# Patient Record
Sex: FEMALE | Employment: UNEMPLOYED | URBAN - METROPOLITAN AREA
[De-identification: names, ages, dates, MRNs, and addresses within clinical notes are randomized per-mention and may not be internally consistent; named-entity substitution may affect disease eponyms.]

---

## 2021-11-04 ENCOUNTER — TELEPHONE (OUTPATIENT)
Dept: INTERNAL MEDICINE | Age: 31
End: 2021-11-04

## 2021-11-04 RX ORDER — SERTRALINE HYDROCHLORIDE 100 MG/1
200 TABLET, FILM COATED ORAL DAILY
COMMUNITY
Start: 2021-11-02

## 2021-11-04 RX ORDER — RIZATRIPTAN BENZOATE 10 MG/1
10 TABLET ORAL
COMMUNITY

## 2021-11-04 RX ORDER — ERENUMAB-AOOE 140 MG/ML
140 INJECTION, SOLUTION SUBCUTANEOUS
COMMUNITY
Start: 2021-11-02 | End: 2021-12-29 | Stop reason: SDUPTHER

## 2021-11-04 ASSESSMENT — PROMIS GLOBAL HEALTH SCALE
HOW IS THE PROMIS V1.1 BEING ADMINISTERED?: 1
IN THE PAST 7 DAYS, HOW WOULD YOU RATE YOUR FATIGUE ON AVERAGE [ON A SCALE FROM 1 (NONE) TO 5 (VERY SEVERE)]?: 5
SUM OF RESPONSES TO QUESTIONS 2, 4, 5, & 10: 16
IN GENERAL, WOULD YOU SAY YOUR HEALTH IS...[ON A SCALE OF 1 (POOR) TO 5 (EXCELLENT)]: 5
SUM OF RESPONSES TO QUESTIONS 3, 6, 7, & 8: 16
WHO IS THE PERSON COMPLETING THE PROMIS V1.1 SURVEY?: 0
IN THE PAST 7 DAYS, HOW OFTEN HAVE YOU BEEN BOTHERED BY EMOTIONAL PROBLEMS, SUCH AS FEELING ANXIOUS, DEPRESSED, OR IRRITABLE [ON A SCALE FROM 1 (NEVER) TO 5 (ALWAYS)]?: 2
IN GENERAL, HOW WOULD YOU RATE YOUR SATISFACTION WITH YOUR SOCIAL ACTIVITIES AND RELATIONSHIPS [ON A SCALE OF 1 (POOR) TO 5 (EXCELLENT)]?: 5
IN THE PAST 7 DAYS, HOW WOULD YOU RATE YOUR PAIN ON AVERAGE [ON A SCALE FROM 0 (NO PAIN) TO 10 (WORST IMAGINABLE PAIN)]?: 1
IN GENERAL, HOW WOULD YOU RATE YOUR PHYSICAL HEALTH [ON A SCALE OF 1 (POOR) TO 5 (EXCELLENT)]?: 5
IN GENERAL, WOULD YOU SAY YOUR QUALITY OF LIFE IS...[ON A SCALE OF 1 (POOR) TO 5 (EXCELLENT)]: 5
IN GENERAL, HOW WOULD YOU RATE YOUR MENTAL HEALTH, INCLUDING YOUR MOOD AND YOUR ABILITY TO THINK [ON A SCALE OF 1 (POOR) TO 5 (EXCELLENT)]?: 4
IN GENERAL, PLEASE RATE HOW WELL YOU CARRY OUT YOUR USUAL SOCIAL ACTIVITIES (INCLUDES ACTIVITIES AT HOME, AT WORK, AND IN YOUR COMMUNITY, AND RESPONSIBILITIES AS A PARENT, CHILD, SPOUSE, EMPLOYEE, FRIEND, ETC) [ON A SCALE OF 1 (POOR) TO 5 (EXCELLENT)]?: 5
TO WHAT EXTENT ARE YOU ABLE TO CARRY OUT YOUR EVERYDAY PHYSICAL ACTIVITIES SUCH AS WALKING, CLIMBING STAIRS, CARRYING GROCERIES, OR MOVING A CHAIR [ON A SCALE OF 1 (NOT AT ALL) TO 5 (COMPLETELY)]?: 5

## 2021-11-04 NOTE — TELEPHONE ENCOUNTER
Specialty Medication Service    Patient's Name: Chey Young YOB: 1990      Chey Young is a 28 y.o. female presenting today for Specialty Medication Service visit. Patient is prescribed SMS formulary medication, Aimovig. Medication list updated. Specialty Medication: AIMOVIG 140 MG/ML AUTOINJECTOR   Frequency: Every 30 days  Indication: Migraines  Initially Diagnosed: 2013  Additional Therapy:   · Maxalt  Previous Therapy:   · Propranolol  · Topiramate    Specialist:   Vikram Xiao  [address]  [phone]    Specialist Progress Note Available: Yes, scanned in Media tab  Last Specialist Visit: 11/22/2021 - doing well on Aimovig, continue meds as prescribed    Not on File     No past medical history on file. Social History     Tobacco Use    Smoking status: Not on file   Substance Use Topics    Alcohol use: Not on file     No family history on file. REVIEW OF CURRENT DISEASE STATE  She has a well established history of recurrent migraines. The migraines usually occur rare and typically last a few hour. The headaches are brought on by missing meals and lack of sleep, and are relieved by Rx Meds - triptan therapy. Current number of migraines in past month: 1 Her migraines have been getting better lately. Migraine treatment typically includes triptan therapy and CGRP inhibitors. Lifestyle factors associated with migraines:   More than 2 drinks per day? No  Dehydration? No  High caffeine intake? No  High stress level? No  Irregular sleep patterns? No  Skipping meals? Yes  Too much screen time?  No    · Considering all the ways in which this illness and health conditions may affect you at this time, how are you doing (0 = very well, 10 = very poorly): 0  · How would you rate your pain on average? (0 = no pain, 10 = worst pain imaginable) 1  · During the past 4 weeks, have you missed any planned activities of daily living due to condition (work/school/other plans)? No  · During the past 4 weeks, have you had to seek urgent care, ER admission, Unplanned doctor office visit, or hospital admission? No    Contraindications to therapy present? No    MEDICATIONS  Current Outpatient Medications   Medication Sig Dispense Refill    AIMOVIG 140 MG/ML SOAJ Inject 140 mg into the skin every 30 days      sertraline (ZOLOFT) 100 MG tablet Take 200 mg by mouth daily       No current facility-administered medications for this visit. Current Specialty Medication:   Erenumab (Aimovig)  ADR Monitoring: Hypersensitivity reactions and Constipation including cases with serious complications resulting in hospitalization and surgery, has been reported. Constipation has generally occurred after the first dose; however, a later onset has also been observed. Concurrent use of medications that decrease GI motility may increase the risk for more severe constipation and the potential for constipation-related complications. Lab Monitoring: None recommended at this time. Instructed patient to keep tract of number of monthly migraine days to access efficacy  Storage: Refrigerate pre-filled syringe at 2°C to 8°C (36°F to 46°F). Do not freeze. Do not shake. Handling: Keep in original carton to protect from light until time of use. If removed from fridge, keep at room temperature in the original carton. (Must be used within 7 days). Do not use beyond date on carton if refrigerated or beyond 7 days if out of refrigerator.    Prior to SQ administration, remove from refrigerator and allow to sit at room temperature, outside of carton for at least 30 minutes (protect from direct sunlight) - do not warm by using other heat source (e.x hot water or microwave)  Patient reported side effects: none    Specialty Medication Start Date: 2020  Appropriate Dose: Yes    Describe your medication adherence over the last 4 weeks: Excellent  How many doses have you missed in the last the past 7 days how often have you been bothered by emotional problems such as feeling anxious, depressed or irritable? 2   In the past 7 days how would you rate your fatigue on average? 5   In the past 7 days how would you rate your pain on average? 1   Mode of Collection 1   Person Completing Survey 0   PROMIS Physical Score 16   PROMIS Mental Score 16     No flowsheet data found. 1. Migraine Headache   Mike Watson is a 28 y.o. female being treated for Migraine.  Medication reconciliation completed, no drug-drug interactions identified. Allergy and diagnosis info reviewed and updated. Mike Watson has a history remarkable for the following conditions: migraines   The patient has previously been treated with topiramate, propranolol. Current therapy includes: Aimovig + Maxalt   Warnings, precautions, and contraindications reviewed with the patient. Also reviewed storage, administration, and proper disposal.   Current disease state symptoms include: 1 migraine per month that lasts a couple hours   Medication Effectiveness: Patient disease is  well controlled on current therapy.  No side effects/adverse events reported, and no adherence issues identified.  Reviewed copay and advised patient that they will receive a copy of the patient rights and responsibility document with their welcome packet in the mail.  Patient is not considered high risk. Based on patient feedback/results of the assessment, the therapy is  still appropriate.  No medication-related problem(s) or patient need(s) identified that would require a care plan. Follow up in 180 days     2. Immunizations   Immunization status: up to date and documented, missing doses of COVID booster. Patient verbalized understanding. 3. Drug Interactions   None    PLAN  Goals of therapy, common side effects, medication storage, and administration reviewed with patient.   continue Aimovig 140 mg every 30 days as prescribed Recommended monitoring to complete: none at this time  Recommended immunizations: COVID  Non pharmacotherapy recommendations: Get enough sleep; Reduce stress; Drink plenty of water; Avoid triggers; Regular physical exercise  Keep all scheduled appointments.      Gino Hernandez, PharmD, [address]  Ambulatory Clinical Pharmacist   Specialty Medication Service  Phone: [phone]    For Pharmacy [address] in place:  No   Recommendation Provided To: Provider: 1 via Note to Provider and Patient/Caregiver: 1 via Telephone   Intervention Detail: Adherence Monitorin, Refill(s) Provided and Vaccine Recommended/Administered    Intervention Accepted By: Provider: 1 and Patient/Caregiver: 1   Time Spent (min): 45

## 2021-12-27 ENCOUNTER — NURSE TRIAGE (OUTPATIENT)
Dept: OTHER | Facility: CLINIC | Age: 31
End: 2021-12-27

## 2021-12-27 NOTE — TELEPHONE ENCOUNTER
Subjective: Caller states \"I started having COVID symptoms this morning \"     Current Symptoms:   +sore throat  +feeling feverish  +non productive cough   +fatigue   -feeling a \"little lightheaded\"   - denies shortness of breath or chest pain     Onset: 1 days ago; sudden    Associated Symptoms: increased sleepiness    Pain Severity: 0/10; N/A; none    Temperature: unsure  N/A     What has been tried:   +no medications or interventions     LMP: 12/24/2021 Pregnant: No    Recommended disposition: Call PCP in 24 hours; advised to proceed to [facility] if no appointments available     Care advice provided, patient verbalizes understanding; denies any other questions or concerns; instructed to call back for any new or worsening symptoms. Follow up with PCP     Attention Provider: Thank you for allowing me to participate in the care of your patient. The patient was connected to triage in response to symptoms provided. Please do not respond through this encounter as the response is not directed to a shared pool.       Reason for Disposition   [1] COVID-19 infection suspected by caller or triager AND [2] mild symptoms (cough, fever, or others) AND [3] negative COVID-19 rapid test    Protocols used: COVID-19 - DIAGNOSED OR SUSPECTED-ADULT-

## 2022-01-03 ENCOUNTER — VIRTUAL VISIT (OUTPATIENT)
Dept: INTERNAL MEDICINE | Age: 32
End: 2022-01-03

## 2022-01-03 DIAGNOSIS — G43.709 CHRONIC MIGRAINE WITHOUT AURA WITHOUT STATUS MIGRAINOSUS, NOT INTRACTABLE: Primary | ICD-10-CM

## 2022-01-03 PROCEDURE — 1111F DSCHRG MED/CURRENT MED MERGE: CPT | Performed by: INTERNAL MEDICINE

## 2022-01-03 PROCEDURE — 99999 PR OFFICE/OUTPT VISIT,PROCEDURE ONLY: CPT | Performed by: INTERNAL MEDICINE

## 2022-01-03 RX ORDER — ERENUMAB-AOOE 140 MG/ML
140 INJECTION, SOLUTION SUBCUTANEOUS
Qty: 1 ML | Refills: 6 | Status: SHIPPED | OUTPATIENT
Start: 2022-01-03 | End: 2022-06-22 | Stop reason: SDUPTHER

## 2022-01-03 ASSESSMENT — PATIENT HEALTH QUESTIONNAIRE - PHQ9: DEPRESSION UNABLE TO ASSESS: URGENT/EMERGENT SITUATION

## 2022-01-03 NOTE — PROGRESS NOTES
Initial Specialty Medication Virtual Visit  [facility]  [facility] [address]  [place] [address]  Dept: [phone]  Dept Fax: [phone]  Date of patient's visit: 1/3/2022  Patient's Name:  Noe Meadows YOB: 1990            No care team member to display  ================================================================    REASON FOR VISIT/CHIEF COMPLAINT:  Medication Management (New Patient, Irvin Garcia)    HISTORY OF PRESENTING ILLNESS:  Noe Meadows is 28 y.o. is here for initial virtual visit for specialty medication. Specialty Medication: Aimovig 140 mg/ml  Frequency: every 30 days  Indication: Chronic headaches  Initially Diagnosed: 2013  Specialist: Claudetta Cumins  Last Specialist Visit: 2021  Side effects includes: None   Current symptoms include: headaches much improved  Noe Meadows has no new complain today. Recent blood work done on N/A . DIAGNOSTIC FINDINGS:  CBC:No results found for: WBC, HGB, PLT    BMP:  No results found for: NA, K, CL, CO2, BUN, CREATININE, GLUCOSE    HEMOGLOBIN A1C: No results found for: LABA1C    FASTING LIPID PANEL:No results found for: CHOL, HDL, TRIG    No results found for: SARAHY    No results found for: HAV, HEPAIGM, HEPBIGM, HEPBCAB, HBEAG, HEPCAB        There is no problem list on file for this patient.       Health Maintenance Due   Topic Date Due    Hepatitis C screen  Never done    Varicella vaccine (1 of 2 - 2-dose childhood series) Never done    Depression Screen  Never done    HIV screen  Never done    Cervical cancer screen  Never done    COVID-19 Vaccine (3 - Booster for Pfizer series) 09/19/2021       No Known Allergies      Current Outpatient Medications   Medication Sig Dispense Refill    AIMOVIG 140 MG/ML SOAJ Inject 140 mg into the skin every 30 days 1 mL 6    sertraline (ZOLOFT) 100 MG tablet Take 200 mg by mouth daily      rizatriptan (MAXALT) 10 MG tablet Take 10 mg by mouth once as needed for Migraine May repeat in 2 hours if needed       No current facility-administered medications for this visit. Social History     Tobacco Use    Smoking status: Not on file    Smokeless tobacco: Not on file   Substance Use Topics    Alcohol use: Not on file    Drug use: Not on file       No family history on file. REVIEW OF SYSTEMS:  Review of Systems    PHYSICAL EXAM:  There were no vitals filed for this visit. BP Readings from Last 3 Encounters:   No data found for BP          ASSESSMENT AND PLAN:  Dale Mitchell was seen today for medication management. Diagnoses and all orders for this visit:    Chronic migraine without aura without status migrainosus, not intractable  -     AIMOVIG 140 MG/ML SOAJ; Inject 140 mg into the skin every 30 days  -     [facility]) Specialty Medication Service      FOLLOW UP AND INSTRUCTIONS:  · Follow up with 6 months    · Dale Mitchell received counseling on the following healthy behaviors: nutrition, exercise and medication adherence    · Discussed use, benefit, and side effects of prescribed medications. Barriers to medication compliance addressed. All patient questions answered. Pt voiced understanding.       Fany Delacruz  Director [employer] pharmacy program  [facility]    [facility]  [address] [place] [address]    1/3/2022, 9:02 AM

## 2022-01-03 NOTE — PATIENT INSTRUCTIONS
Medications e-scribe to pharmacy of pt's choice. Patient was put on a wait list and will be contacted to schedule their next follow up appointment once the schedule is available. If the patient is in need of an appointment before their next visit please call the office at [phone]. After Visit Summary  mailed to patient.     SL

## 2022-01-31 PROBLEM — G43.009 MIGRAINE WITHOUT AURA AND WITHOUT STATUS MIGRAINOSUS, NOT INTRACTABLE: Status: ACTIVE | Noted: 2017-11-08

## 2022-04-26 LAB
CHOLESTEROL, TOTAL: 188 MG/DL (ref 0–199)
GLUCOSE BLD-MCNC: 85 MG/DL (ref 70–99)
HDLC SERPL-MCNC: 61 MG/DL (ref 40–59)
LDL CHOLESTEROL CALCULATED: 115 MG/DL (ref 0–129)
TRIGL SERPL-MCNC: 61 MG/DL (ref 0–150)

## 2022-05-15 DIAGNOSIS — G43.709 CHRONIC MIGRAINE WITHOUT AURA WITHOUT STATUS MIGRAINOSUS, NOT INTRACTABLE: ICD-10-CM

## 2022-05-16 RX ORDER — ERENUMAB-AOOE 140 MG/ML
INJECTION, SOLUTION SUBCUTANEOUS
Qty: 1 ML | Refills: 6 | OUTPATIENT
Start: 2022-05-16

## 2022-05-16 NOTE — TELEPHONE ENCOUNTER
Patient is due for SMS follow up 7/1/2022 and will get new prescription at that time.     Mason Tinoco PharmD, [place]  Ambulatory Clinical Pharmacist   Specialty Medication Service  Phone: [phone]    For Pharmacy Admin Tracking Only     CPA in place:  No   Time Spent (min): 15

## 2022-05-16 NOTE — TELEPHONE ENCOUNTER
Request for Aimovig. Next Visit Date:  No future appointments.     Health Maintenance   Topic Date Due    Varicella vaccine (1 of 2 - 2-dose childhood series) Never done    Depression Screen  Never done    HIV screen  Never done    Hepatitis C screen  Never done    Cervical cancer screen  Never done    COVID-19 Vaccine (3 - Booster for Pfizer series) 08/19/2021    DTaP/Tdap/Td vaccine (8 - Td or Tdap) 03/26/2028    Hepatitis A vaccine  Completed    Hepatitis B vaccine  Completed    Meningococcal (ACWY) vaccine  Completed    Flu vaccine  Completed    Hib vaccine  Aged Out    Pneumococcal 0-64 years Vaccine  Aged Out       No results found for: LABA1C          ( goal A1C is < 7)   No results found for: LABMICR  LDL Calculated (mg/dL)   Date Value   04/26/2022 115       (goal LDL is <100)   No results found for: AST, ALT, BUN  BP Readings from Last 3 Encounters:   No data found for BP          (goal 120/80)    All Future Testing planned in [facility]        Patient Active Problem List:     Migraine without aura and without status migrainosus, not intractable

## 2022-05-31 ENCOUNTER — TELEPHONE (OUTPATIENT)
Dept: INTERNAL MEDICINE | Age: 32
End: 2022-05-31

## 2022-06-10 NOTE — TELEPHONE ENCOUNTER
Left message to schedule appointment for SMS. Will continue to outreach as appropriate.      Claudell Braver, PharmD, [address]  Ambulatory Clinical Pharmacist   Specialty Medication Service  Phone: [phone]

## 2022-06-22 ENCOUNTER — TELEPHONE (OUTPATIENT)
Dept: INTERNAL MEDICINE | Age: 32
End: 2022-06-22

## 2022-06-22 DIAGNOSIS — G43.709 CHRONIC MIGRAINE WITHOUT AURA WITHOUT STATUS MIGRAINOSUS, NOT INTRACTABLE: ICD-10-CM

## 2022-06-22 RX ORDER — ERENUMAB-AOOE 140 MG/ML
140 INJECTION, SOLUTION SUBCUTANEOUS
Qty: 1 ML | Refills: 2 | Status: SHIPPED | OUTPATIENT
Start: 2022-06-22 | End: 2022-11-04 | Stop reason: SDUPTHER

## 2022-06-22 NOTE — TELEPHONE ENCOUNTER
Medication Management Service    Date: 6/22/2022  Patient's Name: Taran Meade YOB: 1990            _____________________________________________________________________________________________    Patient due for follow up appointment with SMS. Patient unable to schedule appointment before gap coverage in SMS pharmacist begins. Will issue 3 month prescription to get patient through until next appointment availability.     Gladys Lombardi PharmD, [place]  Ambulatory Clinical Pharmacist   Specialty Medication Service  Phone: [phone]

## 2022-08-15 ENCOUNTER — TELEPHONE (OUTPATIENT)
Dept: INTERNAL MEDICINE | Age: 32
End: 2022-08-15

## 2022-08-15 NOTE — TELEPHONE ENCOUNTER
SWAPNA requesting a return call to schedule SMS f/u with Amado Vlad. No new prescriptions on file for Aimovig at this time.

## 2022-08-31 ENCOUNTER — NURSE TRIAGE (OUTPATIENT)
Dept: OTHER | Facility: CLINIC | Age: 32
End: 2022-08-31

## 2022-08-31 NOTE — TELEPHONE ENCOUNTER
Subjective: Caller states atraumatic back pain,     Current Symptoms: back pain, unable to walk    Onset: 1 day ago; gradual    Associated Symptoms: reduced activity    Pain Severity: 4/10; sharp; waxing and waning, worse with movement    Temperature: no fever by unknown method    What has been tried: ibuprofen yesterday    LMP: NA Pregnant: NA    Recommended disposition: Go to ED Now    Care advice provided, patient verbalizes understanding; denies any other questions or concerns; instructed to call back for any new or worsening symptoms. Patient/caller agrees to proceed to closest appropriate Emergency Department or consider how you are feeling, if you can make it to PCP or [facility] those are options as well if comfortable waiting until they are open. Attention Provider: Thank you for allowing me to participate in the care of your patient. The patient was connected to triage in response to symptoms provided. Please do not respond through this encounter as the response is not directed to a shared pool.       Reason for Disposition   Unable to walk    Protocols used: Back Pain-ADULT-AH

## 2022-09-14 NOTE — TELEPHONE ENCOUNTER
Specialty Medication Service    Date: 9/14/2022  Patient's Name: Dduley May YOB: 1990            _____________________________________________________________________________________________    Left message to schedule PharmD follow up appointment for Specialty Medication Services. Please call: [phone] option 4. Will continue to outreach as appropriate.     Jason Stevenson PharmD, [facility]  Ambulatory Clinical Pharmacist   Specialty Medication Service/[facility]  Phone: [phone]  [place]  Phone: [phone]

## 2022-09-16 NOTE — TELEPHONE ENCOUNTER
Specialty Medication Service    Date: 9/16/2022  Patient's Name: Juan Francisco Cedñeo YOB: 1990            _____________________________________________________________________________________________    Left message to schedule PharmD follow up appointment for Specialty Medication Services. Please call: [phone] option 4. Will continue to outreach as appropriate.     Jalen Miller PharmD, [facility]  Ambulatory Clinical Pharmacist   Specialty Medication Service/[facility]  Phone: [phone]  [place]  Phone: [phone]

## 2022-09-19 NOTE — TELEPHONE ENCOUNTER
Specialty Medication Service    Date: 9/19/2022  Patient's Name: Kylie Waller YOB: 1990            _____________________________________________________________________________________________    Left message to schedule PharmD follow up appointment for Specialty Medication Services. Please call: [phone] option 4. Will continue to outreach as appropriate.     Misty SouzaD, [facility]  Ambulatory Clinical Pharmacist   Specialty Medication Service/[facility]  Phone: [phone]  [place]  Phone: [phone]    For Pharmacy [address] [place] [address] in place:  No  Time Spent (min): 15

## 2022-10-26 ENCOUNTER — TELEPHONE (OUTPATIENT)
Dept: INTERNAL MEDICINE | Age: 32
End: 2022-10-26

## 2022-10-26 NOTE — TELEPHONE ENCOUNTER
Specialty Medication Service    Date: 10/26/2022  Patient's Name: Avelina Morales YOB: 1990            _____________________________________________________________________________________________    Left message to schedule PharmD follow up appointment for Specialty Medication Services. Please call: [phone] option 4. Will continue to outreach as appropriate.     Misty MarroquinD, [facility]  Ambulatory Clinical Pharmacist   Specialty Medication Service/[facility]  Phone: [phone]  [facility]  Phone: [phone]

## 2022-10-28 NOTE — TELEPHONE ENCOUNTER
Specialty Medication Service    Date: 10/28/2022  Patient's Name: Octavio Galvez YOB: 1990            _____________________________________________________________________________________________    Left message to schedule PharmD follow up appointment for Specialty Medication Services. Please call: [phone] option 4. Will continue to outreach as appropriate.     Eloise Rowley PharmD, [facility]  Ambulatory Clinical Pharmacist   Specialty Medication Service/[facility]  Phone: [phone] [address] [facility]  Phone: [phone]

## 2022-10-31 DIAGNOSIS — G43.709 CHRONIC MIGRAINE WITHOUT AURA WITHOUT STATUS MIGRAINOSUS, NOT INTRACTABLE: ICD-10-CM

## 2022-10-31 RX ORDER — ERENUMAB-AOOE 140 MG/ML
INJECTION, SOLUTION SUBCUTANEOUS
Qty: 1 ML | Refills: 2 | OUTPATIENT
Start: 2022-10-31

## 2022-10-31 NOTE — TELEPHONE ENCOUNTER
Specialty Medication Service    Date: 10/31/2022  Patient's Name: Fredis Ma YOB: 1990            _____________________________________________________________________________________________    Left message to schedule PharmD follow up appointment for Specialty Medication Services. Please call: [phone] option 4. Will continue to outreach as appropriate.     Phu Umana PharmD, [facility]  Ambulatory Clinical Pharmacist   Specialty Medication Service/[facility]  Phone: [phone]  Massimo bran [facility]  Phone: [phone]    For Pharmacy Admin Tracking Only    CPA in place:  Yes  Recommendation Provided To: Patient/Caregiver: 1 via Telephone  Intervention Detail: Scheduled Appointment   Intervention Accepted By: Patient/Caregiver: 0  Time Spent (min): 15

## 2022-10-31 NOTE — TELEPHONE ENCOUNTER
Specialty Medication Service    Date: 10/31/2022  Patient's Name: Jean Claude Fischer YOB: 1990            _____________________________________________________________________________________________    Patient is due for follow up [facility] appointment. Patient has not answered phone calls for needed appointment. Refill denied at this time.     Juliann Mg, PharmD, [facility]  Ambulatory Clinical Pharmacist   Specialty Medication Service/[facility]  Phone: [phone]  [facility]  Phone: [phone]

## 2022-11-01 ENCOUNTER — TELEPHONE (OUTPATIENT)
Dept: INTERNAL MEDICINE | Age: 32
End: 2022-11-01

## 2022-11-01 NOTE — TELEPHONE ENCOUNTER
Patient scheduled for SMS follow up visit on 11/4/22 at 930 AM.       Thanks,  Sena Weinstein, PharmD, BCPS  [address] [place] [address] in place:  No  Recommendation Provided To: Patient/Caregiver: 1 via Telephone  Intervention Detail: Scheduled Appointment  Intervention Accepted By: Patient/Caregiver: 1  Time Spent (min): 10

## 2022-11-04 ENCOUNTER — PHARMACY VISIT (OUTPATIENT)
Dept: INTERNAL MEDICINE | Age: 32
End: 2022-11-04

## 2022-11-04 DIAGNOSIS — G43.709 CHRONIC MIGRAINE WITHOUT AURA WITHOUT STATUS MIGRAINOSUS, NOT INTRACTABLE: ICD-10-CM

## 2022-11-04 PROCEDURE — 99999 PR OFFICE/OUTPT VISIT,PROCEDURE ONLY: CPT | Performed by: PHARMACIST

## 2022-11-04 RX ORDER — ERENUMAB-AOOE 140 MG/ML
140 INJECTION, SOLUTION SUBCUTANEOUS
Qty: 3 ML | Refills: 1 | Status: SHIPPED | OUTPATIENT
Start: 2022-11-04

## 2022-11-04 ASSESSMENT — PATIENT HEALTH QUESTIONNAIRE - PHQ9
SUM OF ALL RESPONSES TO PHQ QUESTIONS 1-9: 0
SUM OF ALL RESPONSES TO PHQ QUESTIONS 1-9: 0
SUM OF ALL RESPONSES TO PHQ9 QUESTIONS 1 & 2: 0
SUM OF ALL RESPONSES TO PHQ QUESTIONS 1-9: 0
SUM OF ALL RESPONSES TO PHQ QUESTIONS 1-9: 0
2. FEELING DOWN, DEPRESSED OR HOPELESS: 0
1. LITTLE INTEREST OR PLEASURE IN DOING THINGS: 0

## 2022-11-04 ASSESSMENT — PROMIS GLOBAL HEALTH SCALE
IN GENERAL, PLEASE RATE HOW WELL YOU CARRY OUT YOUR USUAL SOCIAL ACTIVITIES (INCLUDES ACTIVITIES AT HOME, AT WORK, AND IN YOUR COMMUNITY, AND RESPONSIBILITIES AS A PARENT, CHILD, SPOUSE, EMPLOYEE, FRIEND, ETC) [ON A SCALE OF 1 (POOR) TO 5 (EXCELLENT)]?: 4
WHO IS THE PERSON COMPLETING THE PROMIS V1.1 SURVEY?: 0
IN GENERAL, HOW WOULD YOU RATE YOUR SATISFACTION WITH YOUR SOCIAL ACTIVITIES AND RELATIONSHIPS [ON A SCALE OF 1 (POOR) TO 5 (EXCELLENT)]?: 4
IN GENERAL, WOULD YOU SAY YOUR QUALITY OF LIFE IS...[ON A SCALE OF 1 (POOR) TO 5 (EXCELLENT)]: 4
IN THE PAST 7 DAYS, HOW WOULD YOU RATE YOUR PAIN ON AVERAGE [ON A SCALE FROM 0 (NO PAIN) TO 10 (WORST IMAGINABLE PAIN)]?: 1
SUM OF RESPONSES TO QUESTIONS 3, 6, 7, & 8: 15
HOW IS THE PROMIS V1.1 BEING ADMINISTERED?: 1
TO WHAT EXTENT ARE YOU ABLE TO CARRY OUT YOUR EVERYDAY PHYSICAL ACTIVITIES SUCH AS WALKING, CLIMBING STAIRS, CARRYING GROCERIES, OR MOVING A CHAIR [ON A SCALE OF 1 (NOT AT ALL) TO 5 (COMPLETELY)]?: 5
IN GENERAL, HOW WOULD YOU RATE YOUR MENTAL HEALTH, INCLUDING YOUR MOOD AND YOUR ABILITY TO THINK [ON A SCALE OF 1 (POOR) TO 5 (EXCELLENT)]?: 4
IN THE PAST 7 DAYS, HOW WOULD YOU RATE YOUR FATIGUE ON AVERAGE [ON A SCALE FROM 1 (NONE) TO 5 (VERY SEVERE)]?: 5
IN THE PAST 7 DAYS, HOW OFTEN HAVE YOU BEEN BOTHERED BY EMOTIONAL PROBLEMS, SUCH AS FEELING ANXIOUS, DEPRESSED, OR IRRITABLE [ON A SCALE FROM 1 (NEVER) TO 5 (ALWAYS)]?: 1
IN GENERAL, HOW WOULD YOU RATE YOUR PHYSICAL HEALTH [ON A SCALE OF 1 (POOR) TO 5 (EXCELLENT)]?: 4
SUM OF RESPONSES TO QUESTIONS 2, 4, 5, & 10: 13
IN GENERAL, WOULD YOU SAY YOUR HEALTH IS...[ON A SCALE OF 1 (POOR) TO 5 (EXCELLENT)]: 4

## 2022-11-04 NOTE — PROGRESS NOTES
Specialty Medication Service    Patient's Name: Susanne Arvizu YOB: 1990      Reason for visit: Susanne Arvizu is a 33 y.o. female presenting today for Specialty Medication Service visit follow up. Patient last seen by [facility] 1/3/2022. Patient continues on SMS formulary medication, Aimovig. Pharmacy completed Specialty Medication Service visit for medication monitoring and counseling. Medication list updated. Specialty Medication: AIMOVIG 140 MG/ML AUTOINJECTOR   Frequency: Every 30 days  Indication: Migraines  Initially Diagnosed: 2013  Additional Therapy:   Maxalt  Previous Therapy:   Propranolol  Topiramate    Specialist:   Lopez Moser  [facility] [address]  [phone]    Specialist Progress Note Available: Yes, scanned in Media tab  Last Specialist Visit: 4/28/2022 - doing well on Aimovig, continue meds as prescribed; retiring end of 2022    No Known Allergies    No past medical history on file. Social History     Tobacco Use    Smoking status: Not on file    Smokeless tobacco: Not on file   Substance Use Topics    Alcohol use: Not on file     No family history on file. INTERM HISTORY  Have you been diagnosed with any additional conditions since we last talked? no  Have you developed any new allergies since we last talked? no  Have you stopped taking any medications or supplements since we last talked? no  Have you started taking any additional medications or supplements since we last talked? no    REVIEW OF CURRENT DISEASE STATE  Migraine Headaches: She has a well established history of recurrent migraines. The migraines usually occur rare and typically last a few hour. The headaches are brought on by missing meals and lack of sleep, and are relieved by Rx Meds - triptan therapy. Current number of migraines in past month: 1 Her migraines have been getting better lately. Migraine treatment typically includes triptan therapy and CGRP inhibitors. Lifestyle factors associated with migraines:   More than 2 drinks per day? No  Dehydration? No  High caffeine intake? No  High stress level? No  Irregular sleep patterns? No  Skipping meals? Yes  Too much screen time? No    · Considering all the ways in which illness and health conditions may affect you at this time, please indicate below how you are doing: (0 = very well, 10 = very poorly)? 2  · How would you rate your pain on average? (0 = no pain, 10 = worst pain imaginable) 1  · During the past 4 weeks, have you missed any activities of daily living (work/school)? No  · During the past 4 weeks, have you had to seek urgent care? No    MEDICATIONS  Current Outpatient Medications   Medication Sig Dispense Refill    AIMOVIG 140 MG/ML SOAJ Inject 140 mg into the skin every 30 days 3 mL 1    sertraline (ZOLOFT) 100 MG tablet Take 200 mg by mouth daily      rizatriptan (MAXALT) 10 MG tablet Take 10 mg by mouth once as needed for Migraine May repeat in 2 hours if needed       No current facility-administered medications for this visit. Current Specialty Medication:   Erenumab (Aimovig)  Indication Specific Recommended Dose:   Migraine prevention: 70 to 140 mg once monthly  Contraindications: known serious hypersensitivity to erenumab  Warnings/Precautions: Hypersensitivity reactions and Constipation including cases with serious complications resulting in hospitalization and surgery, has been reported. Constipation has generally occurred after the first dose; however, a later onset has also been observed. Concurrent use of medications that decrease GI motility may increase the risk for more severe constipation and the potential for constipation-related complications. Recommended Monitoring: None recommended at this time. Instructed patient to keep tract of number of monthly migraine days to access efficacy  Storage: Refrigerate pre-filled syringe at 2°C to 8°C (36°F to 46°F). Do not freeze. Do not shake. Handling: Keep in original carton to protect from light until time of use. If removed from fridge, keep at room temperature in the original carton. (Must be used within 7 days). Do not use beyond date on carton if refrigerated or beyond 7 days if out of refrigerator. Prior to SQ administration, remove from refrigerator and allow to sit at room temperature, outside of carton for at least 30 minutes (protect from direct sunlight) - do not warm by using other heat source (e.x hot water or microwave)  Patient Reported Side Effects: none    Specialty Medication Start Date: 2020  Appropriate Dose: Yes    Describe your medication adherence over the last 4 weeks: Excellent  How many doses have you missed in the last 4 weeks, if any? 0  How confident are you to follow the injection process and the treatment plan? (0-10) 10 Who injects? self  Does the patient have a current infection of any kind? No  Last refill of abortive medication: 8/28/2022  Number of refills on abortive medication in last 3 months: 1    Contraindications to therapy present? No    Drug Interactions:  No clinically significant interactions identified via [email or web address] Interaction Analysis as category D or higher.  _____________________________________________________________________  Renal Dosing:  Creatinine Clearance: CrCl cannot be calculated (No successful lab value found. ). No renal adjustments necessary.     LABS  No results found for: BUN, CREATININE  No results found for: WBC, HGB, HCT, RBC, PLT  No results found for: ALKPHOS, ALT, AST, BILITOT, BILIDIR, IBILI    IMMUNIZATIONS  Immunization History   Administered Date(s) Administered    COVID-19, PFIZER PURPLE top, DILUTE for use, (age 15 y+), 30mcg/0.3mL 02/25/2021, 03/19/2021    DTP 1990, 1990, 1990, 09/14/1992    DTaP vaccine 05/08/1997    Hepatitis A Adult (Havrix, Vaqta) 04/29/2009, 12/22/2009    Hepatitis B Ped/Adol (Engerix-B, Recombivax HB) 04/29/2009, 06/03/2009, 08/26/2009    Hib vaccine 1990, 1990, 1990    Influenza Virus Vaccine 10/26/2021    Influenza, FLUARIX, FLULAVAL, Marcille Fly (age 10 mo+) AND AFLURIA, (age 1 y+), PF, 0.5mL 09/13/2017, 10/25/2020    Influenza, FLUCELVAX, (age 10 mo+), MDCK, PF, 0.5mL 10/29/2018    MMR 06/09/1992, 05/08/1997    Meningococcal MCV4P (Menactra) 04/29/2009    PPD Test 12/28/2011    Polio OPV 1990, 1990, 1990, 05/08/1997    Tdap (Boostrix, Adacel) 04/29/2009, 03/26/2018      Immunization status: up to date and documented. ASSESSMENTS  Fall Risk 11/4/2022 11/4/2021   2 or more falls in past year? no no   Fall with injury in past year? no no     PROMIS V1.1 Global Health 11/4/2022 11/4/2021   In general, would you say your health is: 4 5   In general, would you say your quality of life is: 4 5   In general, how would you rate your physical health? 4 5   In general, how would you rate your mental health, including your mood and your ability to think? 4 4   In general, how would you rate your satisfaction with your social activities and relationships? 4 5   In general, please rate how well you carry out your usual social activities and roles. (This includes activities at home, at work and in your community, and responsibilities as a parent, child, spouse, employee, friend, etc.) 4 5   To what extent are you able to carry out your everyday physical activities such as walking, climbing stairs, carrying groceries, or moving a chair? 5 5   In the past 7 days how often have you been bothered by emotional problems such as feeling anxious, depressed or irritable? 1 2   In the past 7 days how would you rate your fatigue on average? 5 5   In the past 7 days how would you rate your pain on average? 1 1   Mode of Collection 1 1   Person Completing Survey 0 0   PROMIS Physical Score 15 16   PROMIS Mental Score 13 16     Migraine Headache  Heidi Bonnet is a 33 y.o. female being treated for Migraines.   Medication Loki.    PLAN  Goals of therapy, common side effects, medication storage, and administration reviewed with patient. continue Aimovig 140 mg every 30 days as prescribed   Recommended monitoring to complete: none at this time  Recommended immunizations: none at this time  Non pharmacotherapy recommendations: Get enough sleep; Reduce stress; Drink plenty of water; Avoid triggers; Regular physical exercise  Keep all scheduled appointments. Return to clinic in 6 month(s). or call with any questions or concerns.      Marlys Valdovinos, PharmD, [address]  Ambulatory Clinical Pharmacist   Specialty Medication Service/[facility]  Phone: [phone]  [facility]  Phone: [phone]    For Pharmacy Admin Tracking Only    CPA in place:  Yes  Recommendation Provided To: Patient/Caregiver: 3 via Virtual Visit  Intervention Detail: Refill(s) Provided and Scheduled Appointment   Intervention Accepted By: Patient/Caregiver: 3  Time Spent (min): 60

## 2022-11-04 NOTE — Clinical Note
Shad Paul. Completed pharmacy review for yearly medical director SMS visit. Patient is continuing on [address] for migraines. No recommendations for therapy at this time. Patient is recommended for no vaccination. Medication pended for review.   Thank you, Trev Darnell, PharmD, [facility] Ambulatory Clinical Pharmacist  Specialty Medication Service/[facility] Phone: [phone] [facility] Phone: [phone]

## 2022-12-19 ENCOUNTER — PHARMACY VISIT (OUTPATIENT)
Dept: INTERNAL MEDICINE | Age: 32
End: 2022-12-19

## 2022-12-19 DIAGNOSIS — G43.709 CHRONIC MIGRAINE WITHOUT AURA WITHOUT STATUS MIGRAINOSUS, NOT INTRACTABLE: Primary | ICD-10-CM

## 2022-12-19 PROCEDURE — 1111F DSCHRG MED/CURRENT MED MERGE: CPT | Performed by: INTERNAL MEDICINE

## 2022-12-19 PROCEDURE — 99999 PR OFFICE/OUTPT VISIT,PROCEDURE ONLY: CPT | Performed by: INTERNAL MEDICINE

## 2022-12-19 NOTE — PROGRESS NOTES
Initial Specialty Medication Virtual Visit  [facility]  [facility] [address] [address]  Dept: [phone]  Dept Fax: [phone]  Date of patient's visit: 12/19/2022  Patient's Name:  Genny Montilla YOB: 1990            Patient Care Team:  Sneha Babin MD as PCP - [facility] Provider  ================================================================    REASON FOR VISIT/CHIEF COMPLAINT:  Medication Management (Aimovig )    HISTORY OF PRESENTING ILLNESS:  Genny Montilla is 33 y.o. is here for initial virtual visit for specialty medication. Specialty Medication: Aimovig 140 mg/ml autoinjection  Frequency: Monthly  Indication: Migraines  Initially Diagnosed:   Specialist: Evaristo Valentine MD  Last Specialist Visit: 04/2022  Side effects includes: None   Current symptoms include: Well controlled migraines  Genny Montilla has no new complain today. Recent blood work done on N/A .         DIAGNOSTIC FINDINGS:  CBC:No results found for: WBC, HGB, PLT    BMP:    Lab Results   Component Value Date/Time    GLUCOSE 85 04/26/2022 08:28 AM       HEMOGLOBIN A1C: No results found for: LABA1C    FASTING LIPID PANEL:  Lab Results   Component Value Date    CHOL 188 04/26/2022    HDL 61 (H) 04/26/2022    TRIG 61 04/26/2022       No results found for: SARAHY    No results found for: HAV, HEPAIGM, HEPBIGM, HEPBCAB, HBEAG, HEPCAB        Patient Active Problem List   Diagnosis    Migraine without aura and without status migrainosus, not intractable       Health Maintenance Due   Topic Date Due    Varicella vaccine (1 of 2 - 2-dose childhood series) Never done    HIV screen  Never done    Hepatitis C screen  Never done    Cervical cancer screen  Never done       No Known Allergies      Current Outpatient Medications   Medication Sig Dispense Refill    AIMOVIG 140 MG/ML SOAJ Inject 140 mg into the skin every 30 days 3 mL 1    sertraline (ZOLOFT) 100 MG tablet Take 200 mg by mouth daily      rizatriptan (MAXALT) 10 MG tablet Take 10 mg by mouth once as needed for Migraine May repeat in 2 hours if needed       No current facility-administered medications for this visit. No family history on file. REVIEW OF SYSTEMS:  Review of Systems    PHYSICAL EXAM:  There were no vitals filed for this visit. BP Readings from Last 3 Encounters:   No data found for BP          ASSESSMENT AND PLAN:  There are no diagnoses linked to this encounter. Given the patient's condition, the patient should continue with the current care provided by the pharmacist.    FOLLOW UP AND INSTRUCTIONS:  Follow up in 1 year    [facility] received counseling on the following healthy behaviors: nutrition, exercise, and medication adherence    Discussed use, benefit, and side effects of prescribed medications. Barriers to medication compliance addressed. All patient questions answered. Pt voiced understanding.       Kong Mckenna  Director [facility]  Faculty Internal Medicine    [facility]  [address] [place] [address]    12/19/2022, 8:24 AM

## 2023-01-30 ENCOUNTER — OFFICE VISIT (OUTPATIENT)
Dept: FAMILY MEDICINE CLINIC | Age: 33
End: 2023-01-30
Payer: COMMERCIAL

## 2023-01-30 VITALS
WEIGHT: 196 LBS | DIASTOLIC BLOOD PRESSURE: 78 MMHG | SYSTOLIC BLOOD PRESSURE: 110 MMHG | BODY MASS INDEX: 30.76 KG/M2 | HEIGHT: 67 IN | OXYGEN SATURATION: 97 % | HEART RATE: 92 BPM | TEMPERATURE: 98 F

## 2023-01-30 DIAGNOSIS — M54.42 ACUTE BILATERAL LOW BACK PAIN WITH LEFT-SIDED SCIATICA: Primary | ICD-10-CM

## 2023-01-30 PROCEDURE — 99204 OFFICE O/P NEW MOD 45 MIN: CPT | Performed by: FAMILY MEDICINE

## 2023-01-30 RX ORDER — METHYLPREDNISOLONE 4 MG/1
TABLET ORAL
COMMUNITY
Start: 2023-01-02

## 2023-01-30 RX ORDER — MULTIVITAMIN/IRON/FOLIC ACID 18MG-0.4MG
TABLET ORAL
COMMUNITY

## 2023-01-30 RX ORDER — CYCLOBENZAPRINE HCL 5 MG
5 TABLET ORAL 2 TIMES DAILY PRN
Qty: 20 TABLET | Refills: 0 | Status: SHIPPED | OUTPATIENT
Start: 2023-01-30 | End: 2023-02-09

## 2023-01-30 RX ORDER — IBUPROFEN 800 MG/1
800 TABLET ORAL
Qty: 12 TABLET | Refills: 0 | Status: SHIPPED | OUTPATIENT
Start: 2023-01-30 | End: 2023-02-03

## 2023-01-30 SDOH — ECONOMIC STABILITY: FOOD INSECURITY: WITHIN THE PAST 12 MONTHS, THE FOOD YOU BOUGHT JUST DIDN'T LAST AND YOU DIDN'T HAVE MONEY TO GET MORE.: NEVER TRUE

## 2023-01-30 SDOH — ECONOMIC STABILITY: FOOD INSECURITY: WITHIN THE PAST 12 MONTHS, YOU WORRIED THAT YOUR FOOD WOULD RUN OUT BEFORE YOU GOT MONEY TO BUY MORE.: NEVER TRUE

## 2023-01-30 ASSESSMENT — PATIENT HEALTH QUESTIONNAIRE - PHQ9
SUM OF ALL RESPONSES TO PHQ QUESTIONS 1-9: 0
SUM OF ALL RESPONSES TO PHQ QUESTIONS 1-9: 0
1. LITTLE INTEREST OR PLEASURE IN DOING THINGS: 0
SUM OF ALL RESPONSES TO PHQ9 QUESTIONS 1 & 2: 0
2. FEELING DOWN, DEPRESSED OR HOPELESS: 0
SUM OF ALL RESPONSES TO PHQ QUESTIONS 1-9: 0
SUM OF ALL RESPONSES TO PHQ QUESTIONS 1-9: 0

## 2023-01-30 ASSESSMENT — SOCIAL DETERMINANTS OF HEALTH (SDOH): HOW HARD IS IT FOR YOU TO PAY FOR THE VERY BASICS LIKE FOOD, HOUSING, MEDICAL CARE, AND HEATING?: NOT HARD AT ALL

## 2023-01-30 NOTE — PROGRESS NOTES
Radames Trinidad (:  1990) is a 33 y.o. female,Established patient, here for evaluation of the following chief complaint(s):  Lower Back Pain (Came on sudden while at work. Has happened before in the past, but would like to know why. Was at work and the pain started, couldn't sit or walk. It is hard to stand at times. )        SUBJECTIVE    History of present illness:     Patient presents to establish care    Medical history reviewed  Migraines-states are well controlled on Aimovig  Depression-states is well controlled on Zoloft    Low back pain    Reports pain in bilateral lumbar spine  Reports radiation goes down left leg  Denies lower extremity weakness  Denies inciting injury or trauma  Has had several episodes of this starting back in August  Was seen in the emergency room  Was given Toradol, steroids, and a muscle relaxer  States that the Toradol seemed to help  States the pain started again earlier today  Having a lot of difficulty flexing forward  Sitting is uncomfortable  Denies fevers, history of malignancy, saddle anesthesia, bowel or bladder incontinence or retention    Review of Symptoms: As per HPI. History reviewed. No pertinent past medical history. History reviewed. No pertinent surgical history.   Family History   Problem Relation Age of Onset    Other Mother     Hypertension Father      Social History     Socioeconomic History    Marital status: Single     Spouse name: Not on file    Number of children: Not on file    Years of education: Not on file    Highest education level: Not on file   Occupational History    Not on file   Tobacco Use    Smoking status: Never    Smokeless tobacco: Never   Substance and Sexual Activity    Alcohol use: Yes    Drug use: Never    Sexual activity: Not on file   Other Topics Concern    Not on file   Social History Narrative    Not on file     Social Determinants of Health     Financial Resource Strain: Low Risk     Difficulty of Paying Living Expenses: Not hard at all   Food Insecurity: No Food Insecurity    Worried About Running Out of Food in the Last Year: Never true    Ran Out of Food in the Last Year: Never true   Transportation Needs: Not on file   Physical Activity: Not on file   Stress: Not on file   Social Connections: Not on file   Intimate Partner Violence: Not on file   Housing Stability: Not on file     Patient has no known allergies. Prior to Admission medications    Medication Sig Start Date End Date Taking?  Authorizing Provider   methylPREDNISolone (MEDROL DOSEPACK) 4 MG tablet  1/2/23  Yes Historical Provider, MD   Magnesium Oxide (MAGNESIUM-OXIDE) 250 MG TABS tablet Take by mouth   Yes Historical Provider, MD   vitamin B-2 (RIBOFLAVIN) 100 MG TABS tablet Take by mouth   Yes Historical Provider, MD   ibuprofen (ADVIL;MOTRIN) 800 MG tablet Take 1 tablet by mouth 3 times daily (with meals) for 4 days 1/30/23 2/3/23 Yes Sherri May,    cyclobenzaprine (FLEXERIL) 5 MG tablet Take 1 tablet by mouth 2 times daily as needed for Muscle spasms 1/30/23 2/9/23 Yes Sherri May, DO   AIMOVIG 140 MG/ML SOAJ Inject 140 mg into the skin every 30 days 11/4/22  Yes Cody Santos MD   sertraline (ZOLOFT) 100 MG tablet Take 200 mg by mouth daily 11/2/21  Yes Historical Provider, MD   rizatriptan (MAXALT) 10 MG tablet Take 10 mg by mouth once as needed for Migraine May repeat in 2 hours if needed   Yes Historical Provider, MD       OBJECTIVE     /78 (Site: Right Upper Arm, Position: Standing, Cuff Size: Medium Adult)   Pulse 92   Temp 98 °F (36.7 °C) (Tympanic)   Ht 5' 7\" (1.702 m)   Wt 196 lb (88.9 kg)   LMP 01/29/2023 (Exact Date)   SpO2 97%   BMI 30.70 kg/m²     General: alert and oriented, NAD  Head: normocephalic, atraumatic  Cardiovascular: No cyanosis, no edema  Respiratory: No respiratory distress, no accessory muscle use  Psychological: normal mood and affect    MSK:    Active range of motion  Extension and flexion limited due to pain  Rotation bilaterally grossly intact  Tenderness to palpation in bilateral lumbar paraspinal muscles    Neurological:     Able to walk on toes and heels  Negative straight leg sign bilaterally      ASSESSMENT/PLAN:    Lumbago  Likely mechanical in etiology   Ddx- muscle spasm, herniated disc, lumbosacral sprain  Low suspicion of malignancy, cauda equina, or conus medullaris   Reviewed report of XR from August 2022. No degeneration or fracture. Rx for 800 mg ibuprofen and flexeril  Referral to PT  Will schedule patient for OMT later this week     While patient was leaving office she had a back spasm and sustained a mechanical fall. No LOC or head trauma. Fall witnessed by office staff. Patient place in wheelchair and laid down in exam room. Given suddenness of spasm advised her not to drive home. She called for a ride home and was able to ambulate prior to leaving office    Letter for work excuse until Thursday provided. Migraine headaches  Controlled  Continue Aimovig and Maxalt PRN     Depression  Stable. PHQ 9: 0  Continue sertraline     Return in about 3 days (around 2/2/2023) for OMT . An electronic signature was used to authenticate this note.

## 2023-02-02 ENCOUNTER — OFFICE VISIT (OUTPATIENT)
Dept: FAMILY MEDICINE CLINIC | Age: 33
End: 2023-02-02
Payer: COMMERCIAL

## 2023-02-02 VITALS
TEMPERATURE: 97.5 F | SYSTOLIC BLOOD PRESSURE: 110 MMHG | HEART RATE: 111 BPM | BODY MASS INDEX: 30.7 KG/M2 | OXYGEN SATURATION: 99 % | WEIGHT: 196 LBS | DIASTOLIC BLOOD PRESSURE: 80 MMHG

## 2023-02-02 DIAGNOSIS — M54.42 ACUTE BILATERAL LOW BACK PAIN WITH LEFT-SIDED SCIATICA: Primary | ICD-10-CM

## 2023-02-02 DIAGNOSIS — M99.04 SOMATIC DYSFUNCTION OF SACRAL REGION: ICD-10-CM

## 2023-02-02 DIAGNOSIS — M99.05 SOMATIC DYSFUNCTION OF PELVIC REGION: ICD-10-CM

## 2023-02-02 DIAGNOSIS — M99.03 SOMATIC DYSFUNCTION OF LUMBAR REGION: ICD-10-CM

## 2023-02-02 PROCEDURE — 99213 OFFICE O/P EST LOW 20 MIN: CPT | Performed by: FAMILY MEDICINE

## 2023-02-02 PROCEDURE — 98926 OSTEOPATH MANJ 3-4 REGIONS: CPT | Performed by: FAMILY MEDICINE

## 2023-02-02 SDOH — ECONOMIC STABILITY: FOOD INSECURITY: WITHIN THE PAST 12 MONTHS, THE FOOD YOU BOUGHT JUST DIDN'T LAST AND YOU DIDN'T HAVE MONEY TO GET MORE.: NEVER TRUE

## 2023-02-02 SDOH — ECONOMIC STABILITY: FOOD INSECURITY: WITHIN THE PAST 12 MONTHS, YOU WORRIED THAT YOUR FOOD WOULD RUN OUT BEFORE YOU GOT MONEY TO BUY MORE.: NEVER TRUE

## 2023-02-02 SDOH — ECONOMIC STABILITY: HOUSING INSECURITY
IN THE LAST 12 MONTHS, WAS THERE A TIME WHEN YOU DID NOT HAVE A STEADY PLACE TO SLEEP OR SLEPT IN A SHELTER (INCLUDING NOW)?: NO

## 2023-02-02 SDOH — ECONOMIC STABILITY: INCOME INSECURITY: HOW HARD IS IT FOR YOU TO PAY FOR THE VERY BASICS LIKE FOOD, HOUSING, MEDICAL CARE, AND HEATING?: NOT HARD AT ALL

## 2023-02-02 NOTE — PROGRESS NOTES
Lidia Romero (:  1990) is a 33 y.o. female,Established patient, here for evaluation of the following chief complaint(s):  Lower Back Pain (Here for f/u, pt states pain level is now around 4-5 . )        SUBJECTIVE    History of present illness:     Has not had spasm since Monday  States Flexeril makes her feel tired, not sure if helping  No more mechanical falls  Still having pain in lumbosacral region without radiation  Has been taking Meloxicam as well     Review of Symptoms: As per HPI. No past medical history on file. No past surgical history on file. Family History   Problem Relation Age of Onset    Other Mother     Hypertension Father      Social History     Socioeconomic History    Marital status: Single     Spouse name: Not on file    Number of children: Not on file    Years of education: Not on file    Highest education level: Not on file   Occupational History    Not on file   Tobacco Use    Smoking status: Never    Smokeless tobacco: Never   Substance and Sexual Activity    Alcohol use: Yes    Drug use: Never    Sexual activity: Not on file   Other Topics Concern    Not on file   Social History Narrative    Not on file     Social Determinants of Health     Financial Resource Strain: Low Risk     Difficulty of Paying Living Expenses: Not hard at all   Food Insecurity: No Food Insecurity    Worried About Running Out of Food in the Last Year: Never true    [address] in the Last Year: Never true   Transportation Needs: Unknown    Lack of Transportation (Medical): Not on file    Lack of Transportation (Non-Medical): No   Physical Activity: Not on file   Stress: Not on file   Social Connections: Not on file   Intimate Partner Violence: Not on file   Housing Stability: Unknown    Unable to Pay for Housing in the Last Year: Not on file    Number of Places Lived in the Last Year: Not on file    Unstable Housing in the Last Year: No     Patient has no known allergies.   Prior to Admission medications    Medication Sig Start Date End Date Taking?  Authorizing Provider   methylPREDNISolone (MEDROL DOSEPACK) 4 MG tablet  1/2/23  Yes Historical Provider, MD   Magnesium Oxide (MAGNESIUM-OXIDE) 250 MG TABS tablet Take by mouth   Yes Historical Provider, MD   vitamin B-2 (RIBOFLAVIN) 100 MG TABS tablet Take by mouth   Yes Historical Provider, MD   ibuprofen (ADVIL;MOTRIN) 800 MG tablet Take 1 tablet by mouth 3 times daily (with meals) for 4 days 1/30/23 2/3/23 Yes Alesha Head,    cyclobenzaprine (FLEXERIL) 5 MG tablet Take 1 tablet by mouth 2 times daily as needed for Muscle spasms 1/30/23 2/9/23 Yes Malcom Stovall,    AIMOVIG 140 MG/ML SOAJ Inject 140 mg into the skin every 30 days 11/4/22  Yes Linda Larsen MD   sertraline (ZOLOFT) 100 MG tablet Take 200 mg by mouth daily 11/2/21  Yes Historical Provider, MD   rizatriptan (MAXALT) 10 MG tablet Take 10 mg by mouth once as needed for Migraine May repeat in 2 hours if needed   Yes Historical Provider, MD       OBJECTIVE     /80 (Site: Right Upper Arm, Position: Sitting, Cuff Size: Medium Adult)   Pulse (!) 111   Temp 97.5 °F (36.4 °C) (Tympanic)   Wt 196 lb (88.9 kg)   LMP 01/29/2023 (Exact Date)   SpO2 99%   BMI 30.70 kg/m²     General: alert and oriented, NAD  Head: normocephalic, atraumatic  Cardiovascular: No cyanosis, no edema  Respiratory: No respiratory distress, no accessory muscle use  Psychological: normal mood and affect    MSK:    Pain with active lumbar flexion  Active extension and bl rotation grossly intact   Has pain while standing    Neurological:     No focal neuro deficits     Osteopathic structural exam:    Lumbar: L2 FRS left tw stills technique, L5 ERS right tw balanced membranous tension, bl lumbar paraspinal restriction tw soft tissue     Sacrum: sacrum extended SlRr tw balance membranous tension    Pelvis: right superior pubic shear tw stills technique and balance ligamentous tension     Outcome of treatment: Treatment tolerated well with improvement in patient's somatic dysfunction. Patient's ambulatory and respiratory biomechanics improved post-treatment. ASSESSMENT/PLAN:    Lumbago  Improving. Continue NSAIDs, muscle relaxer PRN   Encouraged patient to schedule with PT   OMT performed today in office     Somatic Dysfunction    An exam for somatic dysfunction was indicated to evaluate for musculoskeletal manifestations of the patient's primary complaint. Relevant somatic dysfunction was identified in multiple region given the neural and myofascial connections to the patient's chief complaint. The risks, benefits, and alternatives to Osteopathic manipulative treatment (OMT) were reviewed with patient who provided verbal consent to proceed with treatment. Patient declined chaperone. Gentle OMT was applied using both indirect and direct technique approaches. Patient tolerated treatment well without immediate complications. Post-treatment instructions were reviewed with patient, including post-treatment soreness. Total number of regions treated: 3    Techniques utilized: see above. Return in about 2 weeks (around 2/16/2023) for f/u OMT low back . An electronic signature was used to authenticate this note.

## 2023-02-07 ENCOUNTER — HOSPITAL ENCOUNTER (OUTPATIENT)
Dept: PHYSICAL THERAPY | Age: 33
Setting detail: THERAPIES SERIES
Discharge: HOME | End: 2023-02-07
Payer: COMMERCIAL

## 2023-02-07 PROCEDURE — 97140 MANUAL THERAPY 1/> REGIONS: CPT

## 2023-02-07 PROCEDURE — 97162 PT EVAL MOD COMPLEX 30 MIN: CPT

## 2023-02-07 PROCEDURE — 97110 THERAPEUTIC EXERCISES: CPT

## 2023-02-07 ASSESSMENT — PAIN DESCRIPTION - ORIENTATION: ORIENTATION: LOWER

## 2023-02-07 ASSESSMENT — PAIN DESCRIPTION - LOCATION: LOCATION: BACK

## 2023-02-07 ASSESSMENT — PAIN SCALES - GENERAL: PAINLEVEL_OUTOF10: 0

## 2023-02-07 NOTE — PROGRESS NOTES
[facility]  PHYSICAL THERAPY EVALUATION    Physical Therapy: Initial Evaluation    Patient: Shannon Madrigal (40 y.o.     female)   Examination Date: 2023   :  1990 ;    Confirmed: Yes MRN: [id number]  CSN: [id number]   Insurance: Payor: Davi Pod / Plan: Lilly Joanna [address] / Product Type: *No Product type* /   Insurance ID: [id number] - ([facility]) Secondary Insurance (if applicable):    Referring Physician: Federica Spear DO       Visits to Date/Visits Approved: 1 /      No Show/Cancelled Appts: 0 / 0     Medical Diagnosis: Acute bilateral low back pain with left-sided sciatica [M54.42]        Treatment Diagnosis: LBP     PERTINENT MEDICAL HISTORY           Medical History: Chart Reviewed: Yes No past medical history on file. Surgical History: No past surgical history on file. Medications:   Current Outpatient Medications:     methylPREDNISolone (MEDROL DOSEPACK) 4 MG tablet, , Disp: , Rfl:     Magnesium Oxide (MAGNESIUM-OXIDE) 250 MG TABS tablet, Take by mouth, Disp: , Rfl:     vitamin B-2 (RIBOFLAVIN) 100 MG TABS tablet, Take by mouth, Disp: , Rfl:     ibuprofen (ADVIL;MOTRIN) 800 MG tablet, Take 1 tablet by mouth 3 times daily (with meals) for 4 days, Disp: 12 tablet, Rfl: 0    cyclobenzaprine (FLEXERIL) 5 MG tablet, Take 1 tablet by mouth 2 times daily as needed for Muscle spasms, Disp: 20 tablet, Rfl: 0    AIMOVIG 140 MG/ML SOAJ, Inject 140 mg into the skin every 30 days, Disp: 3 mL, Rfl: 1    sertraline (ZOLOFT) 100 MG tablet, Take 200 mg by mouth daily, Disp: , Rfl:     rizatriptan (MAXALT) 10 MG tablet, Take 10 mg by mouth once as needed for Migraine May repeat in 2 hours if needed, Disp: , Rfl:   Allergies: Patient has no known allergies. SUBJECTIVE EXAMINATION     History obtained from[de-identified] Patient,           Subjective History:    Subjective: Currently having her 3rd episode of LBP.   This most recent episode started last Monday. Started aching as the day went on. Went to walk in clinic and had 2 episodes of back spasming once which brought her to her knees. Spasm was on both sides but now seems to be more on the Lt. Pt reports only comfortable lying prone over pillows for the first few days. Now, pain worsens with sitting for a while. When driving on cruise control pain can increase when lifting leg up in LB on both sides but worse on Lt. Has tried heat which helps a little bit. Tried a steroid pack which seemed to improve it. Saw a chiropractor last weekand was given 2 stretches - upward dog and nerve flossing (knee flexion with cervical flexion).   Additional Pertinent Hx (if applicable):     Previous treatments prior to current episode?: Chiropractor      Learning/Language: Learning  Does the patient/guardian have any barriers to learning?: No barriers  Will there be a co-learner?: No  What is the preferred language of the patient/guardian?: English  Is an  required?: No  How does the patient/guardian prefer to learn new concepts?: Listening, Reading, Demonstration, Pictures/Videos     Pain Screening    Pain Screening  Patient Currently in Pain: Yes  Pain Assessment: 0-10  Pain Level: 0  Best Pain Level: 0  Worst Pain Level: 7  Pain Location: Back  Pain Orientation: Lower    Functional Status    Social History:    Social History  Lives With: Alone  Type of Home: House  Home Layout: Two level  Home Access: Stairs to enter without rails  Entrance Stairs - Number of Steps: 4    Occupation/Interests:   Occupation: Full time employment  Type of Occupation: SLP    Prior Level of Function:     Independent        Current Level of Function:   ADL Assistance: Independent  Homemaking Assistance: Independent  Ambulation Assistance: Independent  Transfer Assistance: Independent  Active : Yes         OBJECTIVE EXAMINATION     Review of Systems:  Vision: Within Functional Limits  Hearing: Within functional limits  Overall Orientation Status: Within Normal Limits  Patient affect[de-identified] Normal  Follows Commands: Within Functional Limits    Neuro Screen: Sensation  Overall Sensation Status: WFL      Left Strength  Right Strength         Strength LLE  L Hip Flexion: 5/5  L Hip Extension: 4/5  L Hip ABduction: 4+/5  L Hip Internal Rotation: 4/5  L Hip External Rotation: 4/5  L Knee Flexion: 5/5  L Knee Extension: 5/5  L Ankle Dorsiflexion: 5/5    Strength RLE  R Hip Flexion: 5/5  R Hip Extension: 4/5  R Hip ABduction: 4+/5  R Hip Internal Rotation: 4+/5  R Hip External Rotation: 4+/5  R Knee Flexion: 5/5  R Knee Extension: 5/5  R Ankle Dorsiflexion: 5/5       Lumbar Assessment     AROM Lumbar Spine   Measured as: % of normal  Flexion: <25%  Extension: 75%  Lateral Flexion Right: 75%  Lateral Flexion Left: 75%          Muscle Length/Flexibility:   Muscle Length LE  Right Hamstrings: Tight  Left Hamstrings: Tight      Special Tests:   Special Tests Lumbar Spine  Lumbar Special Tests: Performed  JENNA Test: R (-), L (-)  SLR: L (-), R (-)  Crossed SLR: R (+), L (-)        Outcomes Score:  Exam: Decreased ROM and strength with increased pain impacting QOL and job duties. Treatment:    Exercises:   Exercises  Exercise 1: Piriformis str*  Exercise 2: Seated hip IR/ER with TB*  Exercise 3: TA young with march*  Exercise 4: Bridges with TB*  Exercise 5: 4 way hip*  Exercise 6: Prone ext*  Exercise 7: Roadkill*  Exercise 8: Avoid bending, chair vs floor - education*  Exercise 20: HEP: 2 way SLR, clams, reverse clams, piriformis str, roadkill       Manual:  Manual Therapy  Muscle Energy: MET to correct pelvic alignment, Lt leg pulls     *Indicates exercise,modality, or manual techniques to be initiated when appropriate       ASSESSMENT     Impression: Assessment: Pt presents with a recent onset of LBP causing her difficulty with all mobility. Pt demonstrates significant decreased lumbar flexion ROM.   Pt with mild decreased fermin hip strength and likely decreased core strength possibly impacting her pain. Pt appears to have a slightly elevated LT iliac crest and shortened Lt leg length. Completed MET and Lt leg pulls to improve pelvic alignment. Pt with improved discomfort with prone lying. Pt would benefit from further skilled PT to improve her ROM, strength and pain to increase her overall QOL. Body Structures, Functions, Activity Limitations Requiring Skilled Therapeutic Intervention: Decreased functional mobility , Decreased ROM, Decreased strength, Decreased body mechanics, Increased pain, Decreased posture    Statement of Medical Necessity: Physical Therapy is both indicated and medically necessary as outlined in the POC to increase the likelihood of meeting the functionally related goals stated below. Patient's Activity Tolerance: Patient tolerated evaluation without incident      Patient's rehabilitation potential/prognosis is considered to be: Good    Factors which may impact rehabilitation potential include: None     Patient Education: Goals, PT Role, Plan of Care, Evaluative findings, Home Exercise Program      GOALS   Patient Goal(s): Patient Goals : Work with no pain, Go back to yoga    Short Term Goals Completed by 2 weeks Goal Status   Independent with HEP New     Long Term Goals Completed by 5 weeks Goal Status   LTG 1 Improve lumbar ROM to [facility] to increase ease with ADLs. New   LTG 2 Improve fermin LE strength to >/= 4+/5 to improve stability in all positions. New   LTG 3 Pt will reports >/=75% reduction in LB pain.  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 Clinical Presentation: Medium  Clinical Presentation: Evolving    POST-PAIN     Pain Rating (0-10 pain scale): 0  /10  Location and pain description same as pre-treatment unless indicated. Action: [x] NA  [] Call Physician  [] Perform HEP  [] Meds as prescribed    Evaluation and patient rights have been reviewed and patient agrees with plan of care. Yes  [x]  No  []   Explain:     [place] Fall Risk Assessment  Risk Factor Scale  Score   History of Falls [] Yes  [x] No 25  0 0   Secondary Diagnosis [] Yes  [x] No 15  0 0   Ambulatory Aid [] Furniture  [] Crutches/cane/walker  [x] None/bedrest/wheelchair/nurse 30  15  0 0   IV/Heparin Lock [] Yes  [x] No 20  0 0   Gait/Transferring [] Impaired  [] Weak  [x] Normal/bedrest/immobile 20  10  0 0   Mental Status [] Forgets limitations  [x] Oriented to own ability 15  0 0      Total:0     Based on the Assessment score: check the appropriate box.   [x]  No intervention needed   Low =   Score of 0-24  []  Use standard prevention interventions Moderate =  Score of 24-44   [] Discuss fall prevention strategies   [] Indicate moderate falls risk on eval  []  Use high risk prevention interventions High = Score of 45 and higher   [] Discuss fall prevention strategies   [] Provide supervision during treatment time      Minutes:  PT Individual Minutes  Time In: 1605  Time Out: [address]  Minutes: 39  Timed Code Treatment Minutes: 8 Minutes  Procedure Minutes: 31'     Timed Activity Minutes Units   Manual  8 1       Electronically signed by Richard Rosario PT on 2/7/23 at 6:22 PM EST

## 2023-02-07 NOTE — PROGRESS NOTES
[address]     Ph: [phone]  Fax: [phone]      [x] Certification  [] Recertification []  Plan of Care  [] Progress Note [] Discharge      Referring Provider: Baldev Washington DO     From:  Narcisa Barreto, NAN  Patient: Masha Sarmiento (33 y.o. female) : 1990 Date: 2023  Medical Diagnosis: Acute bilateral low back pain with left-sided sciatica [M54.42]       Treatment Diagnosis: LBP    Progress Report Period from:  2023  to 2023    Visits to Date: 1 No Show: 0 Cancelled Appts: 0    OBJECTIVE:   Short Term Goals - Time Frame for Short Term Goals: 2 weeks    Goals Current/Discharge status  Status   Short Term Goal 1: Independent with HEP  ongoing New     Long Term Goals - Time Frame for Long Term Goals : 5 weeks  Goals Current/ Discharge status Status   Long Term Goal 1: Improve lumbar ROM to [facility] to increase ease with ADLs. AROM Lumbar Spine   Measured as: % of normal  Flexion: <25%  Extension: 75%  Lateral Flexion Right: 75%  Lateral Flexion Left: 75%     New   Long Term Goal 2: Improve fermin LE strength to >/= 4+/5 to improve stability in all positions. Strength LLE  L Hip Flexion: 5/5  L Hip Extension: 4/5  L Hip ABduction: 4+/5  L Hip Internal Rotation: 4/5  L Hip External Rotation: 4/5  L Knee Flexion: 5/5  L Knee Extension: 5/5  L Ankle Dorsiflexion: 5/5  Strength RLE  R Hip Flexion: 5/5  R Hip Extension: 4/5  R Hip ABduction: 4+/5  R Hip Internal Rotation: 4+/5  R Hip External Rotation: 4+/5  R Knee Flexion: 5/5  R Knee Extension: 5/5  R Ankle Dorsiflexion: 5/5    New   Long Term Goal 3: Pt will reports >/=75% reduction in LB pain.  Reports pain ranges from 0-7/10 New       Body Structures, Functions, Activity Limitations Requiring Skilled Therapeutic Intervention: Decreased functional mobility , Decreased ROM, Decreased strength, Decreased body mechanics, Increased pain, Decreased posture  Assessment: Pt presents with a recent onset of LBP causing her difficulty with all mobility. Pt demonstrates significant decreased lumbar flexion ROM. Pt with mild decreased fermin hip strength and likely decreased core strength possibly impacting her pain. Pt appears to have a slightly elevated LT iliac crest and shortened Lt leg length. Completed MET and Lt leg pulls to improve pelvic alignment. Pt with improved discomfort with prone lying. Pt would benefit from further skilled PT to improve her ROM, strength and pain to increase her overall QOL. Therapy Prognosis: Good      PT Education: Goals;PT Role;Plan of Care;Evaluative findings;Home Exercise Program    PLAN: [x] Evaluate and Treat  Frequency/Duration:  Plan Frequency: 1-2  Plan weeks: 5  Current Treatment Recommendations: Strengthening, ROM, Functional mobility training, Neuromuscular re-education, Manual, Home exercise program, Patient/Caregiver education & training, Equipment evaluation, education, & procurement, Modalities, Dry needling                     Patient Status:[x] Continue/ Initiate plan of Care    [] Discharge PT. Recommend pt continue with HEP. [] Additional visits requested, Please re-certify for additional visits:    [] Hold         Signature: Electronically signed by Ana Laura Coombs PT on 2/7/23 at 6:24 PM EST      If you have any questions or concerns, please don't hesitate to call.   Thank you for your referral.

## 2023-02-16 ENCOUNTER — APPOINTMENT (OUTPATIENT)
Dept: PHYSICAL THERAPY | Age: 33
End: 2023-02-16
Payer: COMMERCIAL

## 2023-02-16 PROCEDURE — 97110 THERAPEUTIC EXERCISES: CPT

## 2023-02-16 NOTE — PROGRESS NOTES
[facility]  Outpatient Physical Therapy    Treatment Note        Date: 2023  Patient: Kylie Hanna  : 1990   Confirmed: Yes  MRN: [id number]  Referring Provider: Tobi Gary DO    Medical Diagnosis: No admission diagnoses are documented for this encounter. Treatment Diagnosis: LBP    Visit Information:  Insurance: Payor: KelseyJuan Pavonjohnathondarius [address] / Plan: [facility] [address] / Product Type: *No Product type* /   PT Visit Information  PT Insurance Information: [facility]  Total # of Visits to Date: 1  No Show: 0  Canceled Appointment: 0  Progress Note Counter: -10    Subjective Information:  Subjective: Pt reports using a pillow or towel roll when sitting which has helped a lot especially with driving. Pt reports compliance with HEP. HEP Compliance:  [x] Good [] Fair [] Poor [] Reports not doing due to:    Pain Screening  Patient Currently in Pain: No    Treatment:  Exercises:  Exercises  Exercise 2: Seated hip IR/ER with YTB x10 fermin  Exercise 3: TA young 5s x10, TA young with march x10  Exercise 4: Bridges with YTB 5s x10  Exercise 5: 4 way hip YTB x10ea fermin  Exercise 6: Prone lying on elbows 2'  Exercise 7: Roadkil: with Lt knee up 2'  Exercise 8: Edication on sitting posture  Exercise 9: Seated anterior pelvic tilt  Exercise 20: HEP: seated ant pelvic tilt, TA young, TA young with march       Assessment: Body Structures, Functions, Activity Limitations Requiring Skilled Therapeutic Intervention: Decreased functional mobility , Decreased ROM, Decreased strength, Decreased body mechanics, Increased pain, Decreased posture  Assessment: Pt responding well to extension based program with report of decreased overall symptoms and reduced frequency of radicular symptoms. Initiated fermin hip and core strengthening to stability lumbar spine and pelvis to reduce pt's pain and help maintain proper alignment. Pt to continue with HEP and f/u as needed.   Treatment Diagnosis: LBP  Therapy Prognosis: Good     Activity Tolerance  Activity Tolerance: Patient tolerated treatment well    Post-Pain Assessment:       Pain Rating (0-10 pain scale):  0 /10   Location and pain description same as pre-treatment unless indicated. Action: [x] NA   [] Perform HEP  [] Meds as prescribed  [] Modalities as prescribed   [] Call Physician     GOALS   Patient Goal(s): Patient Goals : Work with no pain, Go back to yoga    Short Term Goals Completed by 2 weeks Goal Status   STG 1 Independent with HEP In progress       Long Term Goals Completed by 5 weeks Goal Status   LTG [facility] ease with ADLs. In progress   LTG 2 Improve fermin LE strength to >/= 4+/5 to improve stability in all positions. In progress   LTG 3 Pt will reports >/=75% reduction in LB pain. In progress       Plan:  Frequency/Duration:  Plan  Plan Frequency: 1-2  Plan weeks: 5  Current Treatment Recommendations: Strengthening, ROM, Functional mobility training, Neuromuscular re-education, Manual, Home exercise program, Patient/Caregiver education & training, Equipment evaluation, education, & procurement, Modalities, Dry needling  Pt to continue current HEP. See objective section for any therapeutic exercise changes, additions or modifications this date.     Therapy Time:      PT Individual Minutes  Time In: 6806  Time Out: 6892  Minutes: 38  Timed Code Treatment Minutes: 38 Minutes  Procedure Minutes:0  Timed Activity Minutes Units   Ther Ex 38 3     Electronically signed by Stephany Stauffer PT on 2/16/23 at 8:58 AM EST

## 2023-04-29 DIAGNOSIS — G43.709 CHRONIC MIGRAINE WITHOUT AURA WITHOUT STATUS MIGRAINOSUS, NOT INTRACTABLE: ICD-10-CM

## 2023-05-01 RX ORDER — ERENUMAB-AOOE 140 MG/ML
INJECTION, SOLUTION SUBCUTANEOUS
Qty: 3 ML | Refills: 1 | Status: SHIPPED | OUTPATIENT
Start: 2023-05-01 | End: 2023-05-05 | Stop reason: SDUPTHER

## 2023-05-01 NOTE — TELEPHONE ENCOUNTER
Specialty Medication Service    Date: 5/1/2023  Patient's Name: Kimi Robles YOB: 1990            _____________________________________________________________________________________________    Date: 5/1/2023  Patient's Name: Kimi Robles    YOB: 1990            Refill request received from  [facility] for [address]. Patient last seen by [facility] 11/4/2022. CPA on file, will send new SMS prescription.        Floridalma Plasencia, PharmD [facility]  Ambulatory Clinical Pharmacist  Specialty Medication Services  Phone: [phone]  Fax: [phone]    For Pharmacy Admin Tracking Only    Program: SMS  CPA in place:  Yes  Recommendation Provided To: Patient/Caregiver: 1 via Telephone  Intervention Detail: Refill(s) Provided  Intervention Accepted By: Patient/Caregiver: 1  Time Spent (min): 15

## 2023-05-05 ENCOUNTER — PHARMACY VISIT (OUTPATIENT)
Dept: INTERNAL MEDICINE | Age: 33
End: 2023-05-05

## 2023-05-05 DIAGNOSIS — G43.709 CHRONIC MIGRAINE WITHOUT AURA WITHOUT STATUS MIGRAINOSUS, NOT INTRACTABLE: ICD-10-CM

## 2023-05-05 PROCEDURE — 99999 PR OFFICE/OUTPT VISIT,PROCEDURE ONLY: CPT | Performed by: PHARMACIST

## 2023-05-05 RX ORDER — ERENUMAB-AOOE 140 MG/ML
140 INJECTION, SOLUTION SUBCUTANEOUS
Qty: 3 ML | Refills: 1 | Status: SHIPPED | OUTPATIENT
Start: 2023-05-05

## 2023-05-05 ASSESSMENT — PATIENT HEALTH QUESTIONNAIRE - PHQ9
1. LITTLE INTEREST OR PLEASURE IN DOING THINGS: 0
SUM OF ALL RESPONSES TO PHQ QUESTIONS 1-9: 0
2. FEELING DOWN, DEPRESSED OR HOPELESS: 0
SUM OF ALL RESPONSES TO PHQ QUESTIONS 1-9: 0
SUM OF ALL RESPONSES TO PHQ9 QUESTIONS 1 & 2: 0

## 2023-05-05 NOTE — PROGRESS NOTES
Administered Date(s) Administered    COVID-19, MODERNA Bivalent BOOSTER, (age 12y+), IM, 50 mcg/0.5 mL 10/15/2022    COVID-19, PFIZER PURPLE top, DILUTE for use, (age 15 y+), 30mcg/0.3mL 02/25/2021, 03/19/2021    DTP 1990, [birth year], 09/14/1992    DTaP vaccine 05/08/1997    Hep A, HAVRIX, VAQTA, (age 19y+), IM, 1mL 04/29/2009, 12/22/2009    Hep B, ENGERIX-B, RECOMBIVAX-HB, (age Birth - 22y), IM, 0.5mL 04/29/2009, 06/03/2009, 08/26/2009    Hib vaccine 1990, 1990, 1990    Influenza Virus Vaccine 10/26/2021    Influenza, FLUARIX, FLULAVAL, Jennie Altman (age 10 mo+) AND AFLURIA, (age 1 y+), PF, 0.5mL 09/13/2017, 10/25/2020    Influenza, FLUCELVAX, (age 10 mo+), MDCK, PF, 0.5mL 10/29/2018    MMR, PRIORIX, M-M-R II, (age 12m+), SC, 0.5mL 06/09/1992, 05/08/1997    Meningococcal ACWY, MENACTRA (MenACWY-D), (age 7m-55y), IM, 0.5mL 04/29/2009    PPD Test 12/28/2011    Polio OPV 1990, 1990, 1990, 05/08/1997    TDaP, ADACEL (age 10y-63y), BOOSTRIX (age 10y+), IM, 0.5mL 04/29/2009, 03/26/2018      Immunization status: up to date and documented. ASSESSMENTS  Fall Risk 11/4/2022 11/4/2021   2 or more falls in past year? no no   Fall with injury in past year? no no     PROMIS V1.1 Global Health 11/4/2022 11/4/2021   In general, would you say your health is: 4 5   In general, would you say your quality of life is: 4 5   In general, how would you rate your physical health? 4 5   In general, how would you rate your mental health, including your mood and your ability to think? 4 4   In general, how would you rate your satisfaction with your social activities and relationships? 4 5   In general, please rate how well you carry out your usual social activities and roles.  (This includes activities at home, at work and in your community, and responsibilities as a parent, child, spouse, employee, friend, etc.) 4 5   To what extent are you able to carry out your everyday physical activities

## 2023-07-05 ENCOUNTER — OFFICE VISIT (OUTPATIENT)
Dept: PRIMARY CARE CLINIC | Age: 33
End: 2023-07-05
Payer: COMMERCIAL

## 2023-07-05 VITALS
HEIGHT: 67 IN | OXYGEN SATURATION: 98 % | WEIGHT: 196.4 LBS | HEART RATE: 83 BPM | DIASTOLIC BLOOD PRESSURE: 74 MMHG | BODY MASS INDEX: 30.83 KG/M2 | TEMPERATURE: 98.2 F | RESPIRATION RATE: 18 BRPM | SYSTOLIC BLOOD PRESSURE: 124 MMHG

## 2023-07-05 DIAGNOSIS — Z00.00 HEALTH CARE MAINTENANCE: ICD-10-CM

## 2023-07-05 DIAGNOSIS — G43.709 CHRONIC MIGRAINE WITHOUT AURA WITHOUT STATUS MIGRAINOSUS, NOT INTRACTABLE: ICD-10-CM

## 2023-07-05 DIAGNOSIS — S69.91XA INJURY OF RIGHT HAND, INITIAL ENCOUNTER: Primary | ICD-10-CM

## 2023-07-05 LAB
ALBUMIN SERPL-MCNC: 4.3 G/DL (ref 3.5–4.6)
ALP SERPL-CCNC: 76 U/L (ref 40–130)
ALT SERPL-CCNC: <5 U/L (ref 0–33)
ANION GAP SERPL CALCULATED.3IONS-SCNC: 16 MEQ/L (ref 9–15)
AST SERPL-CCNC: 13 U/L (ref 0–35)
BASOPHILS # BLD: 0.1 K/UL (ref 0–0.2)
BASOPHILS NFR BLD: 1 %
BILIRUB SERPL-MCNC: <0.2 MG/DL (ref 0.2–0.7)
BUN SERPL-MCNC: 13 MG/DL (ref 6–20)
CALCIUM SERPL-MCNC: 9.2 MG/DL (ref 8.5–9.9)
CHLORIDE SERPL-SCNC: 105 MEQ/L (ref 95–107)
CO2 SERPL-SCNC: 22 MEQ/L (ref 20–31)
CREAT SERPL-MCNC: 0.71 MG/DL (ref 0.5–0.9)
EOSINOPHIL # BLD: 0.1 K/UL (ref 0–0.7)
EOSINOPHIL NFR BLD: 1 %
ERYTHROCYTE [DISTWIDTH] IN BLOOD BY AUTOMATED COUNT: 14.5 % (ref 11.5–14.5)
GLOBULIN SER CALC-MCNC: 2.8 G/DL (ref 2.3–3.5)
GLUCOSE SERPL-MCNC: 85 MG/DL (ref 70–99)
HCT VFR BLD AUTO: 39.8 % (ref 37–47)
HGB BLD-MCNC: 12.8 G/DL (ref 12–16)
LYMPHOCYTES # BLD: 1.8 K/UL (ref 1–4.8)
LYMPHOCYTES NFR BLD: 24.6 %
MCH RBC QN AUTO: 26.9 PG (ref 27–31.3)
MCHC RBC AUTO-ENTMCNC: 32.3 % (ref 33–37)
MCV RBC AUTO: 83.3 FL (ref 79.4–94.8)
MONOCYTES # BLD: 0.6 K/UL (ref 0.2–0.8)
MONOCYTES NFR BLD: 7.9 %
NEUTROPHILS # BLD: 4.8 K/UL (ref 1.4–6.5)
NEUTS SEG NFR BLD: 65.5 %
PLATELET # BLD AUTO: 300 K/UL (ref 130–400)
POTASSIUM SERPL-SCNC: 4.5 MEQ/L (ref 3.4–4.9)
PROT SERPL-MCNC: 7.1 G/DL (ref 6.3–8)
RBC # BLD AUTO: 4.77 M/UL (ref 4.2–5.4)
SODIUM SERPL-SCNC: 143 MEQ/L (ref 135–144)
TSH REFLEX: 2.51 UIU/ML (ref 0.44–3.86)
WBC # BLD AUTO: 7.4 K/UL (ref 4.8–10.8)

## 2023-07-05 PROCEDURE — 99204 OFFICE O/P NEW MOD 45 MIN: CPT | Performed by: INTERNAL MEDICINE

## 2023-07-05 RX ORDER — RIZATRIPTAN BENZOATE 10 MG/1
10 TABLET ORAL
Qty: 30 TABLET | Refills: 1 | Status: SHIPPED | OUTPATIENT
Start: 2023-07-05 | End: 2023-07-05

## 2023-07-05 RX ORDER — ERENUMAB-AOOE 140 MG/ML
140 INJECTION, SOLUTION SUBCUTANEOUS
Qty: 3 ML | Refills: 5 | Status: SHIPPED | OUTPATIENT
Start: 2023-07-05

## 2023-07-05 ASSESSMENT — ENCOUNTER SYMPTOMS
DIARRHEA: 0
VOMITING: 0
ABDOMINAL PAIN: 0
SHORTNESS OF BREATH: 0
NAUSEA: 0
COUGH: 0
WHEEZING: 0

## 2023-07-05 NOTE — PROGRESS NOTES
Subjective:      Patient ID: Silas Romero is a 29 y.o. female    New pt   HPI  Pt is new to provider. PMHx migraine    No fam hx colon cancer  Last pap: negative less than 3 yrs ago     Needs medication refills. Right hand pain x 7 months  Sharp pain at the base of the thumb, rated 3/10, nonradiating, worse with hand use and relieved with rest. Started after a fall on to th hand. NO tingling or numbness, no weakness. History reviewed. No pertinent past medical history. History reviewed. No pertinent surgical history. Social History     Socioeconomic History    Marital status: Single     Spouse name: Not on file    Number of children: Not on file    Years of education: Not on file    Highest education level: Not on file   Occupational History    Not on file   Tobacco Use    Smoking status: Never    Smokeless tobacco: Never   Substance and Sexual Activity    Alcohol use: Yes    Drug use: Never    Sexual activity: Not on file   Other Topics Concern    Not on file   Social History Narrative    Not on file     Social Determinants of Health     Financial Resource Strain: Low Risk     Difficulty of Paying Living Expenses: Not hard at all   Food Insecurity: No Food Insecurity    Worried About Running Out of Food in the Last Year: Never true    [address] in the Last Year: Never true   Transportation Needs: Unknown    Lack of Transportation (Medical): Not on file    Lack of Transportation (Non-Medical): No   Physical Activity: Not on file   Stress: Not on file   Social Connections: Not on file   Intimate Partner Violence: Not on file   Housing Stability: Unknown    Unable to Pay for Housing in the Last Year: Not on file    Number of Places Lived in the Last Year: Not on file    Unstable Housing in the Last Year: No     Family History   Problem Relation Age of Onset    Other Mother     Hypertension Father      Allergies:  Patient has no known allergies.   Patient Active Problem List   Diagnosis

## 2023-07-06 LAB
HBA1C MFR BLD: 4.9 % (ref 4.8–5.9)
HEPATITIS C ANTIBODY: NONREACTIVE
HIV AG/AB: NONREACTIVE

## 2023-07-07 ENCOUNTER — OFFICE VISIT (OUTPATIENT)
Dept: ORTHOPEDIC SURGERY | Age: 33
End: 2023-07-07

## 2023-07-07 VITALS
HEIGHT: 67 IN | WEIGHT: 196 LBS | OXYGEN SATURATION: 98 % | BODY MASS INDEX: 30.76 KG/M2 | TEMPERATURE: 97.3 F | HEART RATE: 60 BPM

## 2023-07-07 DIAGNOSIS — S62.021A CLOSED DISPLACED FRACTURE OF MIDDLE THIRD OF SCAPHOID BONE OF RIGHT WRIST, INITIAL ENCOUNTER: Primary | ICD-10-CM

## 2023-07-12 DIAGNOSIS — S69.91XA INJURY OF RIGHT HAND, INITIAL ENCOUNTER: Primary | ICD-10-CM

## 2023-07-12 DIAGNOSIS — S62.91XD CLOSED FRACTURE OF RIGHT HAND WITH ROUTINE HEALING, SUBSEQUENT ENCOUNTER: ICD-10-CM

## 2023-07-14 ENCOUNTER — HOSPITAL ENCOUNTER (OUTPATIENT)
Dept: CT IMAGING | Age: 33
Discharge: HOME | End: 2023-07-14
Payer: COMMERCIAL

## 2023-07-14 DIAGNOSIS — S62.021A CLOSED DISPLACED FRACTURE OF MIDDLE THIRD OF SCAPHOID BONE OF RIGHT WRIST, INITIAL ENCOUNTER: ICD-10-CM

## 2023-07-14 PROCEDURE — 73200 CT UPPER EXTREMITY W/O DYE: CPT

## 2023-07-17 ASSESSMENT — ENCOUNTER SYMPTOMS
GASTROINTESTINAL NEGATIVE: 1
EYES NEGATIVE: 1
RESPIRATORY NEGATIVE: 1

## 2023-07-17 NOTE — PROGRESS NOTES
Floyd Palmer (:  1990) is a 29 y.o. female,New patient, follow-up from Dr. Richard Valerio, here for evaluation of the following chief complaint(s):  New Patient (Right wrist fx )         ASSESSMENT/PLAN:  1. Closed displaced fracture of middle third of scaphoid bone of right wrist, initial encounter  -     CT HAND RIGHT WO CONTRAST; Future      No follow-ups on file. Subjective   SUBJECTIVE/OBJECTIVE:  This is a 41-year-old right-hand-dominant female complaining of right wrist pain. States she had a fall 7 months ago injuring her right wrist.  States her symptoms improved but just recently reinjured the wrist.  X-rays performed at her primary care physician shows a nondisplaced fracture of the scaphoid. Patient denies change in sensation of the hand. She is in a wrist brace. Review of Systems   Constitutional: Negative. HENT: Negative. Eyes: Negative. Respiratory: Negative. Gastrointestinal: Negative. Genitourinary: Negative. Musculoskeletal: Negative. Psychiatric/Behavioral: Negative. Objective   Dylan Ruelas MD  [phone] 2023      Narrative & Impression  EXAMINATION:  XRAY VIEWS OF THE RIGHT WRIST     2023 3:06 pm     COMPARISON:  None. HISTORY:  ORDERING SYSTEM PROVIDED HISTORY: Injury of right hand, initial encounter  TECHNOLOGIST PROVIDED HISTORY:  Reason for exam:->fall with hand/thumb pain  Is the patient pregnant?->No     FINDINGS:  There is a fracture through the waist of the scaphoid. The osseous structures appear intact. The joint spaces are maintained. The soft tissues appear unremarkable. IMPRESSION:  There is a scaphoid fracture. Specimen Collected: 23 08:48 EDT Last Resulted: 23 08:49 EDT           Physical Exam  Musculoskeletal:      Comments: Right wrist-little swelling over the dorsum of the distal radius. Anatomical snuffbox is tender. Finkelstein's is negative.   Sensation is

## 2023-07-20 ENCOUNTER — OFFICE VISIT (OUTPATIENT)
Dept: ORTHOPEDIC SURGERY | Age: 33
End: 2023-07-20

## 2023-07-20 VITALS
WEIGHT: 196 LBS | BODY MASS INDEX: 30.76 KG/M2 | HEART RATE: 85 BPM | HEIGHT: 67 IN | OXYGEN SATURATION: 98 % | TEMPERATURE: 97.7 F

## 2023-07-20 DIAGNOSIS — S62.021D CLOSED DISPLACED FRACTURE OF MIDDLE THIRD OF SCAPHOID OF RIGHT WRIST WITH ROUTINE HEALING, SUBSEQUENT ENCOUNTER: Primary | ICD-10-CM

## 2023-07-20 PROCEDURE — 99024 POSTOP FOLLOW-UP VISIT: CPT | Performed by: PHYSICIAN ASSISTANT

## 2023-07-20 ASSESSMENT — ENCOUNTER SYMPTOMS
RESPIRATORY NEGATIVE: 1
EYES NEGATIVE: 1
GASTROINTESTINAL NEGATIVE: 1

## 2023-07-20 NOTE — PROGRESS NOTES
Pallavi Burrows (:  1990) is a 29 y.o. female,Established patient, here for evaluation of the following chief complaint(s):  Follow-up (Ft results f/u )         ASSESSMENT/PLAN:  1. Closed displaced fracture of middle third of scaphoid of right wrist with routine healing, subsequent encounter      No follow-ups on file. Subjective   SUBJECTIVE/OBJECTIVE:  Is a 44-year-old right-hand-dominant female following up for complaint of right wrist pain secondary to nondisplaced fracture of the middle to distal body of the scaphoid. She has had a CAT scan she is here for results. She denies any pain presently. She is in the thumb spica brace. Review of Systems   Constitutional: Negative. HENT: Negative. Eyes: Negative. Respiratory: Negative. Gastrointestinal: Negative. Genitourinary: Negative. Musculoskeletal: Negative. Psychiatric/Behavioral: Negative. Objective   Noah Garcias MD  [phone] 2023      Narrative & Impression  EXAMINATION:  CT OF THE RIGHT HAND WITHOUT CONTRAST 2023 2:59 pm     TECHNIQUE:  CT of the right hand was performed without the administration of intravenous  contrast.  Multiplanar reformatted images are provided for review. Automated  exposure control, iterative reconstruction, and/or weight based adjustment of  the mA/kV was utilized to reduce the radiation dose to as low as reasonably  achievable. COMPARISON:  None. HISTORY  ORDERING SYSTEM PROVIDED HISTORY: Closed displaced fracture of middle third  of scaphoid bone of right wrist, initial encounter  TECHNOLOGIST PROVIDED HISTORY:  Shallow cuts please. Looking to quantify timing (acuteness) of scaphoid  fracture and viability of the bone. Reason for exam:->scaphoid fracture  What reading provider will be dictating this exam?->CRC     FINDINGS:  Bones: There is a transversely oriented fracture of the mid to distal aspect  of the scaphoid.   There is only slight

## 2023-08-01 ENCOUNTER — OFFICE VISIT (OUTPATIENT)
Dept: PRIMARY CARE CLINIC | Age: 33
End: 2023-08-01
Payer: COMMERCIAL

## 2023-08-01 VITALS
DIASTOLIC BLOOD PRESSURE: 70 MMHG | TEMPERATURE: 98 F | BODY MASS INDEX: 30.54 KG/M2 | RESPIRATION RATE: 18 BRPM | OXYGEN SATURATION: 98 % | HEIGHT: 67 IN | WEIGHT: 194.6 LBS | HEART RATE: 78 BPM | SYSTOLIC BLOOD PRESSURE: 120 MMHG

## 2023-08-01 DIAGNOSIS — L30.9 DERMATITIS: ICD-10-CM

## 2023-08-01 DIAGNOSIS — Z00.00 ANNUAL PHYSICAL EXAM: Primary | ICD-10-CM

## 2023-08-01 DIAGNOSIS — S62.001D CLOSED NONDISPLACED FRACTURE OF SCAPHOID OF RIGHT WRIST WITH ROUTINE HEALING, UNSPECIFIED PORTION OF SCAPHOID, SUBSEQUENT ENCOUNTER: ICD-10-CM

## 2023-08-01 PROCEDURE — 99213 OFFICE O/P EST LOW 20 MIN: CPT | Performed by: INTERNAL MEDICINE

## 2023-08-01 PROCEDURE — 99395 PREV VISIT EST AGE 18-39: CPT | Performed by: INTERNAL MEDICINE

## 2023-08-01 ASSESSMENT — ENCOUNTER SYMPTOMS
SHORTNESS OF BREATH: 0
COUGH: 0
DIARRHEA: 0
WHEEZING: 0
NAUSEA: 0
ABDOMINAL PAIN: 0
VOMITING: 0

## 2023-08-01 NOTE — PROGRESS NOTES
Abdominal:      General: Bowel sounds are normal.      Tenderness: There is no abdominal tenderness. Musculoskeletal:      Comments: Right wrist splint removed. Medial distal forearm erythematous patch   Neurological:      General: No focal deficit present. Mental Status: She is alert and oriented to person, place, and time. Cranial Nerves: No cranial nerve deficit. Assessment:       Diagnosis Orders   1. Annual physical exam  Be Well Health Screen      2. Closed nondisplaced fracture of scaphoid of right wrist with routine healing, unspecified portion of scaphoid, subsequent encounter Stable     follows with ortho  OT ongoing      3. Dermatitis  hydrocortisone 2.5 % cream        Plan:      Orders Placed This Encounter   Procedures    Be Well Health Screen     Patient needs to be fasting at least 8-12 hours. Labs included in this order panel:    - Glucose  - Lipid Panel (Total Cholesterol, Triglycerides, HDL, LDL Calculated)         Standing Status:   Future     Standing Expiration Date:   8/1/2024     Orders Placed This Encounter   Medications    hydrocortisone 2.5 % cream     Sig: Apply topically 2 times daily. Dispense:  1 each     Refill:  1     Return in about 1 year (around 8/1/2024) for Annual physical, with PCP.

## 2023-09-06 DIAGNOSIS — Z00.00 ANNUAL PHYSICAL EXAM: ICD-10-CM

## 2023-09-06 LAB
CHOLEST SERPL-MCNC: 152 MG/DL (ref 0–199)
GLUCOSE SERPL-MCNC: 86 MG/DL (ref 70–99)
HDLC SERPL-MCNC: 50 MG/DL (ref 40–59)
LDLC SERPL CALC-MCNC: 90 MG/DL (ref 0–129)
TRIGL SERPL-MCNC: 60 MG/DL (ref 0–150)

## 2023-09-26 ENCOUNTER — TELEPHONE (OUTPATIENT)
Dept: INTERNAL MEDICINE | Age: 33
End: 2023-09-26

## 2023-09-26 NOTE — TELEPHONE ENCOUNTER
Specialty Medication Service    Date: 9/26/2023  Patient's Name: Selvin Ansari YOB: 1990            _____________________________________________________________________________________________    Encounter opened for administrative purposes only.      Simin Wadsworth, PharmD, BCACP  Specialty Medication Service, [facility] Specialty Medication Service // Department, toll free [phone], option 4     For Pharmacy Admin Tracking Only    Program: [facility]  Time Spent (min): 5

## 2023-10-31 ENCOUNTER — TELEPHONE (OUTPATIENT)
Dept: INTERNAL MEDICINE | Age: 33
End: 2023-10-31

## 2023-10-31 NOTE — TELEPHONE ENCOUNTER
Specialty Medication Service    Date: 10/31/2023  Patient's Name: Jaz Mistry YOB: 1990            _____________________________________________________________________________________________    Left message to schedule PharmD follow up appointment for Specialty Medication Services. Please call: [phone] option 4. Will continue to outreach as appropriate.      Kiki Medeiros CPhT  Pharmacy   Specialty Medication Services   Phone: [phone] option 4

## 2023-10-31 NOTE — TELEPHONE ENCOUNTER
Specialty Medication Service    Date: 10/31/2023  Patient's Name: Dinesh Galeas YOB: 1990            _____________________________________________________________________________________________    Patient no longer has [facility]) benefits (termed 12/1/2023). Patient is no longer enrolled in SMS program.  Spoke with patient  and explained they will no longer be eligible for SMS services and that we will be discharging them from the program.She currently does not have new insurance info - will be active after 11/3/2023. Patient instructed to contact [address] [place] [address] ([phone]) to provide updated insurance information for continuity in care if possible. Let patient know to call if she was having any issues getting refills transferred if no longer able to fill with [facility].      Latoya Hopper, PharmD  Ambulatory Clinical Pharmacist   Specialty Medication Services   Phone: [phone] option 4  10/31/2023 1:56 PM    For Pharmacy Admin Tracking Only    Program: [facility]  Time Spent (min): 10

## 2023-11-03 ENCOUNTER — PATIENT MESSAGE (OUTPATIENT)
Dept: PRIMARY CARE CLINIC | Age: 33
End: 2023-11-03

## 2023-11-03 ENCOUNTER — TELEPHONE (OUTPATIENT)
Dept: PRIMARY CARE CLINIC | Age: 33
End: 2023-11-03

## 2024-06-09 DIAGNOSIS — F33.1 MODERATE EPISODE OF RECURRENT MAJOR DEPRESSIVE DISORDER (MULTI): Primary | ICD-10-CM

## 2024-06-09 RX ORDER — ESCITALOPRAM OXALATE 20 MG/1
20 TABLET ORAL DAILY
Qty: 30 TABLET | Refills: 2 | Status: SHIPPED | OUTPATIENT
Start: 2024-06-09 | End: 2024-09-07

## 2024-06-10 NOTE — PROGRESS NOTES
Pt reports feeling more depressed, on Zoloft 100mg daily, felt that 200mg was not much better in the past, would like trial of a different antidepressant.    Lexapro 20mg daily called in.  Pt has FU in August 2024.     Leticia Corbett MD

## 2024-06-17 ENCOUNTER — OFFICE VISIT (OUTPATIENT)
Dept: ORTHOPEDIC SURGERY | Facility: CLINIC | Age: 34
End: 2024-06-17
Payer: COMMERCIAL

## 2024-06-17 ENCOUNTER — HOSPITAL ENCOUNTER (OUTPATIENT)
Dept: RADIOLOGY | Facility: CLINIC | Age: 34
Discharge: HOME | End: 2024-06-17
Payer: COMMERCIAL

## 2024-06-17 DIAGNOSIS — S62.021K CLOSED COMMINUTED FRACTURE OF WAIST OF SCAPHOID BONE OF RIGHT WRIST WITH NONUNION: ICD-10-CM

## 2024-06-17 DIAGNOSIS — S62.021K CLOSED COMMINUTED FRACTURE OF WAIST OF SCAPHOID BONE OF RIGHT WRIST WITH NONUNION: Primary | ICD-10-CM

## 2024-06-17 DIAGNOSIS — M25.531 RIGHT WRIST PAIN: ICD-10-CM

## 2024-06-17 PROCEDURE — 73110 X-RAY EXAM OF WRIST: CPT | Mod: RIGHT SIDE | Performed by: RADIOLOGY

## 2024-06-17 PROCEDURE — 99214 OFFICE O/P EST MOD 30 MIN: CPT | Performed by: ORTHOPAEDIC SURGERY

## 2024-06-17 PROCEDURE — 73110 X-RAY EXAM OF WRIST: CPT | Mod: RT

## 2024-06-17 PROCEDURE — 99204 OFFICE O/P NEW MOD 45 MIN: CPT | Performed by: ORTHOPAEDIC SURGERY

## 2024-06-17 NOTE — PROGRESS NOTES
History present illness: Patient presents status post injury to the right wrist in 2022.  The patient had a fall while rollerskating.  This was in December 2022.  Eventually she had x-rays done in July 2023 that showed evidence for scaphoid fracture.  She was placed into a removable brace at that time after being seen by a midlevel provider through [facility] system.  She was then seen by midlevel provider through the [facility] system.  A custom splint was fabricated.  She was followed for a period of time.  In September 2023 she was told it was healing.  She was then seen by the surgeon and it was recognized that she was nonhealing.  This was February.  She was given a bone stimulator and casted for 4 weeks.  It was felt that it was looking as though things were healing on plain x-ray but that was refuted by the CT findings which demonstrated clear evidence for nonunion.  That was done through [facility] system.  Right-hand-dominant.  Otherwise healthy.  Works as a speech therapist.  Vitamin D was recognized to be low in February 2024.  It was 29 at that time.  She was then given a 4-week period of 50,000 units once weekly and then converted to a daily vitamin D calcium supplement.  She presents today with ongoing right wrist pain.      Past medical history: The patient's past medical history, family history, social history, and review of systems were documented on the patient medical intake.  The updated data was reviewed in the electronic medical record.  History is negative except otherwise stated in history of present illness.        Physical examination:  General: Alert and oriented to person, place, and time.  No acute distress and breathing comfortably: Pleasant and cooperative with examination.  HEENT: Head is normocephalic and atraumatic.  Neck: Supple, no visible swelling.  Cardiovascular: No palpable tachycardia  Lungs: No audible wheezing or labored breathing  Abdomen: Nondistended.  Extremities: Evaluation of the  right upper extremity finds the patient had palpable radial artery at the wrist with brisk capillary refill to all digits.  Patient has intact sensation to axillary radial median and ulnar nerves.  There are no open wounds.  There are no signs of infection.  There is no evidence of lymphedema or lymphatic streaking.  The patient has supple compartments to right arm forearm and hand.  Focal tenderness to right wrist to anatomic snuffbox.  Flexion extension arc measures 90 degree flexion and 45 degrees extension.      Radiology: X-rays of the right wrist show evidence for scaphoid nonunion      Assessment: Right scaphoid nonunion      Plan: Treatment options were discussed.  We talked about operative and nonoperative strategies.  We talked about intraoperative techniques and postoperative protocols for open treatment of scaphoid nonunion with excision of nonunion site and application of autogenous bone graft stabilized with headless compression screw.  Patient ultimately is weighing her options.  She is already set for surgery with a hand fellowship trained plastic surgeon through [facility] [place], operated by [facility].  That is set for 9 July 2024.  She may be shifting gears and doing surgery with us.  That is however going to set it up.  Ultimately it is at her discretion as to whether she pursues surgery with us or through her surgeon with [facility] [place] operated by [facility].        Procedure:

## 2024-06-28 ENCOUNTER — APPOINTMENT (OUTPATIENT)
Dept: ORTHOPEDIC SURGERY | Facility: CLINIC | Age: 34
End: 2024-06-28
Payer: COMMERCIAL

## 2024-08-08 ENCOUNTER — APPOINTMENT (OUTPATIENT)
Dept: NEUROLOGY | Facility: CLINIC | Age: 34
End: 2024-08-08
Payer: COMMERCIAL

## 2024-08-14 ENCOUNTER — OFFICE VISIT (OUTPATIENT)
Dept: OBSTETRICS AND GYNECOLOGY | Facility: CLINIC | Age: 34
End: 2024-08-14
Payer: COMMERCIAL

## 2024-08-14 VITALS
BODY MASS INDEX: 31.52 KG/M2 | SYSTOLIC BLOOD PRESSURE: 133 MMHG | WEIGHT: 200.8 LBS | HEIGHT: 67 IN | DIASTOLIC BLOOD PRESSURE: 87 MMHG

## 2024-08-14 DIAGNOSIS — N94.2 VAGINISMUS: ICD-10-CM

## 2024-08-14 DIAGNOSIS — F33.9 EPISODE OF RECURRENT MAJOR DEPRESSIVE DISORDER, UNSPECIFIED DEPRESSION EPISODE SEVERITY (CMS-HCC): ICD-10-CM

## 2024-08-14 DIAGNOSIS — F32.81 PMDD (PREMENSTRUAL DYSPHORIC DISORDER): Primary | ICD-10-CM

## 2024-08-14 PROCEDURE — 3008F BODY MASS INDEX DOCD: CPT | Performed by: OBSTETRICS & GYNECOLOGY

## 2024-08-14 PROCEDURE — 99214 OFFICE O/P EST MOD 30 MIN: CPT | Performed by: OBSTETRICS & GYNECOLOGY

## 2024-08-14 PROCEDURE — 1036F TOBACCO NON-USER: CPT | Performed by: OBSTETRICS & GYNECOLOGY

## 2024-08-14 PROCEDURE — 99214 OFFICE O/P EST MOD 30 MIN: CPT | Mod: TH | Performed by: OBSTETRICS & GYNECOLOGY

## 2024-08-14 RX ORDER — BUPROPION HYDROCHLORIDE 150 MG/1
150 TABLET ORAL DAILY
Qty: 30 TABLET | Refills: 11 | Status: SHIPPED | OUTPATIENT
Start: 2024-08-14 | End: 2025-08-14

## 2024-08-14 RX ORDER — MAGNESIUM 250 MG
TABLET ORAL
COMMUNITY

## 2024-08-14 RX ORDER — ERENUMAB-AOOE 140 MG/ML
140 INJECTION, SOLUTION SUBCUTANEOUS
COMMUNITY
Start: 2019-10-14

## 2024-08-14 RX ORDER — RIZATRIPTAN BENZOATE 10 MG/1
TABLET ORAL
COMMUNITY
Start: 2023-07-05

## 2024-08-14 RX ORDER — ERGOCALCIFEROL 1.25 MG/1
1 CAPSULE ORAL
COMMUNITY
Start: 2024-02-09

## 2024-08-14 ASSESSMENT — COLUMBIA-SUICIDE SEVERITY RATING SCALE - C-SSRS
1. IN THE PAST MONTH, HAVE YOU WISHED YOU WERE DEAD OR WISHED YOU COULD GO TO SLEEP AND NOT WAKE UP?: NO
6. HAVE YOU EVER DONE ANYTHING, STARTED TO DO ANYTHING, OR PREPARED TO DO ANYTHING TO END YOUR LIFE?: NO
2. HAVE YOU ACTUALLY HAD ANY THOUGHTS OF KILLING YOURSELF?: NO

## 2024-08-14 ASSESSMENT — ENCOUNTER SYMPTOMS
ENDOCRINE NEGATIVE: 0
HEMATOLOGIC/LYMPHATIC NEGATIVE: 0
PSYCHIATRIC NEGATIVE: 0
GASTROINTESTINAL NEGATIVE: 0
EYES NEGATIVE: 0
CONSTITUTIONAL NEGATIVE: 0
RESPIRATORY NEGATIVE: 0
ALLERGIC/IMMUNOLOGIC NEGATIVE: 0
NEUROLOGICAL NEGATIVE: 0
MUSCULOSKELETAL NEGATIVE: 0
CARDIOVASCULAR NEGATIVE: 0

## 2024-08-14 ASSESSMENT — PATIENT HEALTH QUESTIONNAIRE - PHQ9
SUM OF ALL RESPONSES TO PHQ9 QUESTIONS 1 AND 2: 2
1. LITTLE INTEREST OR PLEASURE IN DOING THINGS: SEVERAL DAYS
2. FEELING DOWN, DEPRESSED OR HOPELESS: SEVERAL DAYS
10. IF YOU CHECKED OFF ANY PROBLEMS, HOW DIFFICULT HAVE THESE PROBLEMS MADE IT FOR YOU TO DO YOUR WORK, TAKE CARE OF THINGS AT HOME, OR GET ALONG WITH OTHER PEOPLE: SOMEWHAT DIFFICULT

## 2024-08-14 ASSESSMENT — PAIN SCALES - GENERAL: PAINLEVEL: 0-NO PAIN

## 2024-08-14 NOTE — PROGRESS NOTES
"Assessment   # PMDD, MDD  - Add Wellbutrin 150mg XL to Lexapro 20mg daily  - Add Vitex 400mg daily for PMDD as she is not on ovarian suppression  - Evening primrose oil daily for cyclic mastalgia     # Vaginismus  - resume PT exercises     RV 6 weeks    Leticia Corbett MD    Subjective   35 YO who presents for FU PMDD and MDD.    Had depressive episode April - June. Experienced some improvement with therapy and support from her boyfriend, plus changing from Zoloft 200mg --> Lexapro 20mg. She still doesn't feel 100% though. Continues to have emotional flattening, feels less happy. But no longer hypersomnolent, always tearful. No significant anxiety. No SI/HI except for some passive suicidality premenstrually. Recommend adding Wellbutrin 150mg XL. Also discussed whether it might be helpful for her to pursue ovulation suppression in the future.     OBHx: G0. Would not be upset if she got pregnant with condom failure.     GynHx:  Menstrual Hx: Menarche 12, cycles q28, last 7 days, 3 heavy days, \"big cramps\" 2 days out of the period. No IMS. Mod pain. Hx severe PMDD.  STIs: denies h/o  Contraception: condoms. Hx flattened affect with OCPs in the past.  Sexual History/Complaints: Men, monogamous, hx vaginismus that improved with pelvic PT. Worse right now as she has not done PT in a while.     PapHx:  April 2019 - neg cytology  Sept 2022 - neg cotest     Preventative:  Last mammogram: na  Last Colonoscopy: na  DM Screening: na  DEXA: na  Gardasil: never received  Exercise: was not able to exercise over the summer 2/2 wrist injury  Diet: no restrictions  Seat Belt Use: always     SoHx:  Living Situation: lives by herself (boyfriend of 5 years stays over a lot)  School/Employment: speech language pathologist  Substance: no T/D, social EtOH (1-2 per week)  CAGE: neg  Abuse: denies  Depression Screen: negative     PMH:  Depression - on Lexapro 20mg daily  HA - on Amovig and rizatriptan prn. In between neurologists but has an " "apptmt next week.     PSH: none     FamHx:  No fam hx of breast, ovarian, uterine, or colon cancer    Objective   /87   Ht 1.702 m (5' 7\")   Wt 91.1 kg (200 lb 12.8 oz)   LMP 08/06/2024   BMI 31.45 kg/m²      General:   Alert and oriented, in no acute distress.    Skin: No significant acne. No hirsutism.   Neck: Supple. No visible thyromegaly.    Breast/Axilla: Normal to palpation bilaterally without masses, skin changes, or nipple discharge.    Abdomen: Soft, non-tender, without masses or organomegaly   Psych Depressed and blunted affect. Depressed mood.      "

## 2024-08-22 ENCOUNTER — OFFICE VISIT (OUTPATIENT)
Dept: NEUROLOGY | Facility: CLINIC | Age: 34
End: 2024-08-22
Payer: COMMERCIAL

## 2024-08-22 VITALS — BODY MASS INDEX: 31.55 KG/M2 | HEIGHT: 67 IN | WEIGHT: 201 LBS

## 2024-08-22 DIAGNOSIS — G43.009 MIGRAINE WITHOUT AURA, NOT INTRACTABLE, WITHOUT STATUS MIGRAINOSUS: Primary | ICD-10-CM

## 2024-08-22 RX ORDER — RIBOFLAVIN (VITAMIN B2) 100 MG
TABLET ORAL
COMMUNITY

## 2024-08-22 RX ORDER — SERTRALINE HYDROCHLORIDE 100 MG/1
2 TABLET, FILM COATED ORAL DAILY
COMMUNITY
Start: 2019-04-15 | End: 2024-08-22 | Stop reason: ALTCHOICE

## 2024-08-22 RX ORDER — RIZATRIPTAN BENZOATE 10 MG/1
TABLET, ORALLY DISINTEGRATING ORAL
COMMUNITY
Start: 2019-10-10 | End: 2024-08-22 | Stop reason: WASHOUT

## 2024-08-22 RX ORDER — ELETRIPTAN HYDROBROMIDE 40 MG/1
40 TABLET, FILM COATED ORAL ONCE AS NEEDED
Qty: 9 TABLET | Refills: 5 | Status: SHIPPED | OUTPATIENT
Start: 2024-08-22 | End: 2025-08-22

## 2024-08-22 RX ORDER — TALC
POWDER (GRAM) TOPICAL
COMMUNITY

## 2024-08-22 RX ORDER — OXYCODONE HYDROCHLORIDE 5 MG/1
TABLET ORAL
COMMUNITY
Start: 2024-07-09 | End: 2024-08-22 | Stop reason: ALTCHOICE

## 2024-08-22 RX ORDER — ONDANSETRON 4 MG/1
4 TABLET, FILM COATED ORAL
COMMUNITY
Start: 2024-07-09 | End: 2024-08-22 | Stop reason: ALTCHOICE

## 2024-08-22 RX ORDER — MELATONIN 3 MG
CAPSULE ORAL
COMMUNITY
End: 2024-08-22 | Stop reason: ALTCHOICE

## 2024-08-22 RX ORDER — ERENUMAB-AOOE 140 MG/ML
140 INJECTION, SOLUTION SUBCUTANEOUS
Qty: 3 ML | Refills: 4 | Status: SHIPPED | OUTPATIENT
Start: 2024-08-22 | End: 2024-11-20

## 2024-08-22 ASSESSMENT — ENCOUNTER SYMPTOMS
LOSS OF SENSATION IN FEET: 0
DEPRESSION: 1
OCCASIONAL FEELINGS OF UNSTEADINESS: 0

## 2024-08-22 ASSESSMENT — PATIENT HEALTH QUESTIONNAIRE - PHQ9
2. FEELING DOWN, DEPRESSED OR HOPELESS: SEVERAL DAYS
SUM OF ALL RESPONSES TO PHQ9 QUESTIONS 1 AND 2: 2
10. IF YOU CHECKED OFF ANY PROBLEMS, HOW DIFFICULT HAVE THESE PROBLEMS MADE IT FOR YOU TO DO YOUR WORK, TAKE CARE OF THINGS AT HOME, OR GET ALONG WITH OTHER PEOPLE: SOMEWHAT DIFFICULT
1. LITTLE INTEREST OR PLEASURE IN DOING THINGS: SEVERAL DAYS

## 2024-08-22 NOTE — PROGRESS NOTES
"Subjective     Chief Complaint: Headache    Leslee Hernandez is a 34 y.o. year old female who presents with chief complaint of headaches.    Leslee started getting headaches in teens. Was initially having 3/30 HA days per month. Headaches gradually worsening in frequency and severity. Currently having 12/30 HA days per month. The headaches are usually sharp and are located frontal, generally unilateral, L>R. The patient rates her most severe headaches a 8 in intensity. Generally, headaches last about 16 hours in duration. Associated photophobia and phonophobia. Headaches are not worsened with exertion. Triggers include  poor sleep .    Has a hx of sharp headaches that would wake her up from sleep, affecting L eye. Notes some swelling on that side of the face. Would be restless. Was on birth control during that time. Since stopping BC since that time, has only 1 or 2 episodes since. These were occurring episodically, each lasting 30-60 minutes.     No clear aura.    Former patient of Dr. Lc Contreras.    Current Acute Headache Treatment Rizatriptan (Resolves headaches, 75% of the time, notes some sleepiness SE)   Current Preventative Headache Treatment Erenumab (Aimovig)  (effective without side effect, reduction to 1/30 HA day per month)   Previous Acute Headache Treatment Sumatriptan (not effective)   Previous Preventative Headache Treatment Propranolol   Topiramate      ROS: As per HPI, otherwise all other systems have been reviewed are negative for complaint.     Past Medical History:   Diagnosis Date    Scaphoid fracture of wrist      No past surgical history on file.  Family History   Problem Relation Name Age of Onset    Migraines Mother's Sister      Migraines Maternal Grandfather       Social History     Tobacco Use    Smoking status: Never    Smokeless tobacco: Never   Substance Use Topics    Alcohol use: Never        Objective   Ht 1.702 m (5' 7\")   Wt 91.2 kg (201 lb)   LMP 08/06/2024   BMI 31.48 " kg/m²     Neuro Exam:  Cardiac Exam: No apparent edema of b/l lower extremities  Neurological Exam:  MENTAL STATUS:   General Appearance: No distress, alert, interactive, and cooperative. Orientation was normal to time, place and person. Recent and remote memory was intact.     OPHTHALMOSCOPIC:   The ophthalmoscopic exam was normal. The fundi were well visualized with normal disc margins, clear vessels and vascular pulsations. No disc edema. The cup/disk ratio was not enlarged. No hemorrhages or exudates were present in the posterior segments that were visualized.     CRANIAL NERVES:   CN 2         Visual fields full to confrontation.   CN 3, 4, 6   Pupils round, 4 mm in diameter, equally reactive to light. Lids symmetric; no ptosis. EOMs normal alignment, full range with normal saccades, pursuit and convergence.   No nystagmus.   CN 5   Facial sensation intact bilaterally.   CN 7   Normal and symmetric facial strength. Nasolabial folds symmetric.   CN 8   Hearing intact to conversation and finger rub.  CN 9, 10   Palate elevates symmetrically.  CN 11   Normal strength of shoulder shrug and neck turning.   CN 12   Tongue midline, with normal bulk and strength; no fasciculations.     MOTOR:   Muscle bulk and tone were normal in both upper and lower extremities.   No pronator drift bilaterally.  No fasciculations, tremor or other abnormal movements evident with the patient examined clothed.    STRENGTH:  R  L  Deltoid            5          5  Biceps  5 5  Triceps  5 5    Hip flexion 5 5  Knee Flex 5 5  Knee Ex 5 5    REFLEXES: R L  Biceps  2 2                     Triceps  2 2  Patellar  2 2     SENSORY:   In both upper and lower extremities, sensation was intact to light touch.    COORDINATION:   In both upper extremities, finger-nose-finger was intact without dysmetria or overshoot.     GAIT:   Station was stable with a normal base. Gait was stable with a normal arm swing and speed. No ataxia, shuffling, steppage or  waddling was present. No circumduction was present. No Romberg sign was present.    Assessment/Plan   Given the description and frequency of headaches, patient likely has episodic migraine without aura. Headaches had been well controlled with Aimovig with minimal side effect of constipation, thus will aim to continue. For breakthrough headaches, Rizatriptan has been somewhat effective, but with some side effect. Will switch to eletriptan.    - start Aimovig 140mg monthly  - discontinue rizatriptan  - start eletriptan 40mg PRN  - follow up in 3-4 months    I personally spent 45 minutes today, exclusive of procedures, providing care for this patient, including preparation, face to face time, documentation and other services such as review of medical records, diagnostic result, patient education, counseling, coordination of care as specified in the encounter.

## 2024-08-22 NOTE — PATIENT INSTRUCTIONS
Episodic migraine without aura.     You have been prescribed a medication called Eletriptan at a dose of 40mg. This medication is to be taken as needed to stop a migraine. It is most effective if taken at onset of headache, with a goal of completely resolving a migraine within 2 hours. If you do not have complete resolution of headache within 2 hours, take a second tablet. Max daily dose is 2 tablets (80mg).    Please take this medication less than 10 times per month. Taken too often, there is a chance of developing medication overuse headache, which would result in a higher migraine frequency.     Common side effects include light-headed sensation, drowsiness, and restlessness. Another side effect is the sensation of chest/neck tightness that can last minutes to a few hours. You should not have any difficulty breathing or irregular heart rhythm with this side effect, and it self resolves.    Do not take this medication if you have uncontrolled blood pressure or a history of heart attack, as it can cause vasospasm.     If you do not tolerate this medication, please discontinue its use, and we can discuss alternative acute medications at your upcoming appointment.

## 2024-08-23 ENCOUNTER — SPECIALTY PHARMACY (OUTPATIENT)
Dept: PHARMACY | Facility: CLINIC | Age: 34
End: 2024-08-23

## 2024-09-03 ENCOUNTER — SPECIALTY PHARMACY (OUTPATIENT)
Dept: PHARMACY | Facility: CLINIC | Age: 34
End: 2024-09-03

## 2024-09-03 ENCOUNTER — PHARMACY VISIT (OUTPATIENT)
Dept: PHARMACY | Facility: CLINIC | Age: 34
End: 2024-09-03
Payer: MEDICARE

## 2024-09-03 DIAGNOSIS — G43.009 MIGRAINE WITHOUT AURA, NOT INTRACTABLE, WITHOUT STATUS MIGRAINOSUS: Primary | ICD-10-CM

## 2024-09-03 PROCEDURE — RXMED WILLOW AMBULATORY MEDICATION CHARGE

## 2024-09-03 RX ORDER — FREMANEZUMAB-VFRM 225 MG/1.5ML
225 INJECTION SUBCUTANEOUS
Qty: 1.5 ML | Refills: 6 | Status: SHIPPED | OUTPATIENT
Start: 2024-09-03

## 2024-09-05 ENCOUNTER — SPECIALTY PHARMACY (OUTPATIENT)
Dept: PHARMACY | Facility: CLINIC | Age: 34
End: 2024-09-05

## 2024-09-05 NOTE — PROGRESS NOTES
[facility] Clinical Note    Leslee Hernandez is a 34 y.o. female, who is on the specialty pharmacy service for management of:  Migraine Core.    Leslee Hernandez is taking: Ajovy.    Medication Receipt Date: 9/4/24  Medication Start Date (actual): 9/4/24    Leslee was contacted on 9/5/2024 at 12:22 PM for a virtual pharmacy visit with encounter number [id number] from:   [facility]  [address] [place] [id number]  Dept: [phone]  Dept Fax: [phone]    Leslee was offered a Telephone visit.  Leslee consented to a telephone visit, which was performed.    The most recent encounter visit with the referring prescriber Javier Tucker on 8/22/24 was reviewed.  Pharmacy will continue to collaborate in the care of this patient with the referring prescriber Javier Tucker.    General Assessment      Impression/Plan  IMPRESSION/PLAN:  Is patient high risk (potential patients:  pregnancy, geriatric, pediatric)?  no  Is laboratory follow-up needed? no  Is a clinical intervention needed? no  Next reassessment date? Approx. 3 months  Additional comments:     Refer to the encounter summary report for documentation details about patient counseling and education.      Medication Adherence    The importance of adherence was discussed with the patient and they were advised to take the medication as prescribed by their provider. Patient was encouraged to call their physician's office if they have a question regarding a missed dose.     QOL/Patient Satisfaction  Rate your quality of life on scale of 1-10: 7  Rate your satisfaction with  Specialty Pharmacy on scale of 1-10: 10 - Completely satisfied      Patient was advised to contact the pharmacy if there are any changes to their medication list, including prescriptions, OTC medications, herbal products, or supplements. Patient was advised of [facility]'s dispensing  process, refill timeline, contact information ([phone]), and patient management follow up. Patient confirmed understanding of education conducted during assessment. All patient questions and concerns were addressed to the best of my ability. Patient was encouraged to contact the specialty pharmacy with any questions or concerns.    Confirmed follow-up outreaches are properly scheduled and reviewed goals of therapy with the patient.        Gilberto Kim, PharmD

## 2024-09-10 DIAGNOSIS — F33.1 MODERATE EPISODE OF RECURRENT MAJOR DEPRESSIVE DISORDER: ICD-10-CM

## 2024-09-10 RX ORDER — ESCITALOPRAM OXALATE 20 MG/1
20 TABLET ORAL DAILY
Qty: 90 TABLET | Refills: 0 | Status: SHIPPED | OUTPATIENT
Start: 2024-09-10 | End: 2024-12-09

## 2024-09-26 ENCOUNTER — SPECIALTY PHARMACY (OUTPATIENT)
Dept: PHARMACY | Facility: CLINIC | Age: 34
End: 2024-09-26

## 2024-09-26 ENCOUNTER — PHARMACY VISIT (OUTPATIENT)
Dept: PHARMACY | Facility: CLINIC | Age: 34
End: 2024-09-26
Payer: MEDICARE

## 2024-09-26 PROCEDURE — RXMED WILLOW AMBULATORY MEDICATION CHARGE

## 2024-10-09 ENCOUNTER — APPOINTMENT (OUTPATIENT)
Dept: OBSTETRICS AND GYNECOLOGY | Facility: CLINIC | Age: 34
End: 2024-10-09
Payer: COMMERCIAL

## 2024-10-14 ENCOUNTER — OFFICE VISIT (OUTPATIENT)
Dept: URGENT CARE | Age: 34
End: 2024-10-14
Payer: COMMERCIAL

## 2024-10-14 VITALS
DIASTOLIC BLOOD PRESSURE: 78 MMHG | HEART RATE: 108 BPM | BODY MASS INDEX: 30.61 KG/M2 | RESPIRATION RATE: 19 BRPM | HEIGHT: 67 IN | OXYGEN SATURATION: 96 % | TEMPERATURE: 100.2 F | WEIGHT: 195 LBS | SYSTOLIC BLOOD PRESSURE: 117 MMHG

## 2024-10-14 DIAGNOSIS — Z20.822 LAB TEST NEGATIVE FOR COVID-19 VIRUS: ICD-10-CM

## 2024-10-14 DIAGNOSIS — R05.1 ACUTE COUGH: ICD-10-CM

## 2024-10-14 DIAGNOSIS — R00.0 TACHYCARDIA: ICD-10-CM

## 2024-10-14 DIAGNOSIS — N30.00 ACUTE CYSTITIS WITHOUT HEMATURIA: Primary | ICD-10-CM

## 2024-10-14 DIAGNOSIS — R35.0 URINARY FREQUENCY: ICD-10-CM

## 2024-10-14 DIAGNOSIS — R50.9 FEVER, UNSPECIFIED FEVER CAUSE: ICD-10-CM

## 2024-10-14 LAB
POC APPEARANCE, URINE: CLEAR
POC BILIRUBIN, URINE: NEGATIVE
POC BLOOD, URINE: NEGATIVE
POC COLOR, URINE: YELLOW
POC GLUCOSE, URINE: NEGATIVE MG/DL
POC KETONES, URINE: NEGATIVE MG/DL
POC LEUKOCYTES, URINE: ABNORMAL
POC NITRITE,URINE: NEGATIVE
POC PH, URINE: 6.5 PH
POC PROTEIN, URINE: ABNORMAL MG/DL
POC RAPID INFLUENZA A: NEGATIVE
POC RAPID INFLUENZA B: NEGATIVE
POC SARS-COV-2 AG BINAX: NORMAL
POC SPECIFIC GRAVITY, URINE: 1.01
POC UROBILINOGEN, URINE: 0.2 EU/DL
PREGNANCY TEST URINE, POC: NEGATIVE

## 2024-10-14 RX ORDER — NITROFURANTOIN 25; 75 MG/1; MG/1
100 CAPSULE ORAL 2 TIMES DAILY
Qty: 10 CAPSULE | Refills: 0 | Status: SHIPPED | OUTPATIENT
Start: 2024-10-14 | End: 2024-10-19

## 2024-10-14 RX ORDER — ACETAMINOPHEN 500 MG
1000 TABLET ORAL ONCE
Status: SHIPPED | OUTPATIENT
Start: 2024-10-14

## 2024-10-14 ASSESSMENT — ENCOUNTER SYMPTOMS
TROUBLE SWALLOWING: 0
COUGH: 1
FREQUENCY: 1
RHINORRHEA: 1
CHILLS: 1
VOMITING: 0
FEVER: 1
SHORTNESS OF BREATH: 0
NAUSEA: 1
DIARRHEA: 0
PALPITATIONS: 0
ABDOMINAL PAIN: 0
WHEEZING: 0
CONSTIPATION: 0
SORE THROAT: 0
BACK PAIN: 0

## 2024-10-14 ASSESSMENT — PAIN SCALES - GENERAL: PAINLEVEL: 2

## 2024-10-14 NOTE — PATIENT INSTRUCTIONS
Please seek immediate medical evaluation at the ER if you develop:   -Shortness of breath or difficulty breathing  -You are unable to take a deep breath  -You have difficulties swallowing  -You have chest pain   -You have heart palpitations  -You have fever > 102 F despite fever reducing medications   -You are unable to tolerate fluids for 6 hours or more  -You develop swelling and/or pain in your legs   -You feel faint, lightheaded, or dizzy  -Any other concerning symptoms    You were diagnosed with urinary tract infection.     -You have been prescribed an antibiotic.  Please finish course of antibiotics, unless otherwise told by a provider.  -We will send your urine for culture. We will call you if your antibiotic doesn't treat the bacteria that grew in the culture.   -Take antibiotic with food, yogurt, or a probiotic. I recommend taking a probiotic while taking this medication, and for 7 days after you finish it.  A probiotic is a supplement you can buy over-the-counter that helps support the good bacteria in your body while taking antibiotics. Probiotics help to avoid the side effects of antibiotics, such as diarrhea or yeast infections. It is best to take a probiotic about 2 hours after your dose of antibiotic.   -If you do not feel relief in 24-36 hours, please return or call the clinic so we can change your antibiotic if necessary  -If your symptoms worsen, go to the emergency room for evaluation  -Phenazopyridine (available over the counter as AZO Standard or as a prescription, Pyridium) is for frequency, urgency and bladder spasm relief. It contains orange dye and will stain clothing so wear old underwear while taking. It also can cause nausea if not taken with food.    - Drink a lot of fluid, 3 to 4 quarts a day. Cranberry juice is especially recommended, in addition to large amounts of water.  - Avoid caffeine, tea and carbonated beverages (Coke®, 7-Up®, etc). They tend to irritate the bladder.  - Alcohol  may irritate the prostate.  - Aspirin, ibuprofen (Advil® or Motrin®) or acetaminophen (Tylenol®) may be used for temperature and/or discomfort.  -Follow up with PCP within 1 week if symptoms worsen    TO PREVENT FURTHER INFECTIONS:  -Empty the bladder often. Avoid holding urine for long periods of time.  -After a bowel movement, women should cleanse from front to back. Use each tissue only once.  -Empty the bladder before and after sexual intercourse.  -If you develop back pain, fever, nausea (feeling sick to your stomach), vomiting, or your symptoms (problems) are no better in 3 days, return to clinic. Return sooner if you are getting worse.  -You will be notified if your urine culture is positive.

## 2024-10-14 NOTE — PROGRESS NOTES
Subjective   Patient ID: Leslee Hernandez is a 34 y.o. female. They present today with a chief complaint of Cough and Fever.    History of Present Illness  -fever, chills and mild cough since yesterday  -has not taken any fever reducing medications  -temp max 102 yesterday  -denies ear pain, sore throat, abdominal pain, back pain, sinus pressure/pain, palpitations, CP, SOB  -endorses urinary frequency since yesterday  -endorses mild nausea after eating breakfast this morning, but otherwise denies nausea, vomiting or abdominal pain       Headache  Associated symptoms: cough, fever and nausea    Associated symptoms: no abdominal pain, no back pain, no diarrhea, no ear pain, no sore throat and no vomiting        Past Medical History  Allergies as of 10/14/2024    (No Known Allergies)       (Not in a hospital admission)       Past Medical History:   Diagnosis Date    Scaphoid fracture of wrist        No past surgical history on file.     reports that she has never smoked. She has never used smokeless tobacco. She reports that she does not drink alcohol and does not use drugs.    Review of Systems  Review of Systems   Constitutional:  Positive for chills and fever.   HENT:  Positive for rhinorrhea. Negative for ear pain, sore throat and trouble swallowing.    Respiratory:  Positive for cough. Negative for shortness of breath and wheezing.    Cardiovascular:  Negative for chest pain, palpitations and leg swelling.   Gastrointestinal:  Positive for nausea. Negative for abdominal pain, constipation, diarrhea and vomiting.   Genitourinary:  Positive for frequency.   Musculoskeletal:  Negative for back pain.   All other systems reviewed and are negative.      Objective    Vitals:    10/14/24 0901 10/14/24 0954 10/14/24 1015   BP: 137/88 123/83 117/78   BP Location: Right arm Right arm    Patient Position: Sitting     BP Cuff Size: Small adult     Pulse: (!) 125 (!) 116 108   Resp: 19     Temp: (!) 38.2 °C (100.7 °F)  37.9  "°C (100.2 °F)   TempSrc: Oral  Oral   SpO2: 97% 96% 96%   Weight: 88.5 kg (195 lb)     Height: 1.702 m (5' 7\")       No LMP recorded.    Physical Exam  /78   Pulse 108   Temp 37.9 °C (100.2 °F) (Oral)   Resp 19   Ht 1.702 m (5' 7\")   Wt 88.5 kg (195 lb)   SpO2 96%   BMI 30.54 kg/m²   GEN: Alert, cooperative, no acute distress. Vitals reviewed.  Febrile at 100.7.   GEN: Alert, cooperative, NAD, Vitals Reviewed.   ENT: Bilateral TMs and canals unremarkable, mild sinus congestion, oropharynx unremarkable.   ABDOMEN: Soft, NTND, +BS, no rebound, no guarding.  No CVA tenderness.   RESP: Unlabored, no increased WOB, no accessory muscle use, no wheezing, rhonchi, or rales appreciated.   CARDS: Mild sinus tachycardia    Procedures    Point of Care Test & Imaging Results from this visit  Results for orders placed or performed in visit on 10/14/24   POCT Influenza A/B manually resulted   Result Value Ref Range    POC Rapid Influenza A Negative Negative    POC Rapid Influenza B Negative Negative   POCT UA Automated manually resulted   Result Value Ref Range    POC Color, Urine Yellow Straw, Yellow, Light-Yellow    POC Appearance, Urine Clear Clear    POC Glucose, Urine NEGATIVE NEGATIVE mg/dl    POC Bilirubin, Urine NEGATIVE NEGATIVE    POC Ketones, Urine NEGATIVE NEGATIVE mg/dl    POC Specific Gravity, Urine 1.010 1.005 - 1.035    POC Blood, Urine NEGATIVE NEGATIVE    POC PH, Urine 6.5 No Reference Range Established PH    POC Protein, Urine 30 (1+) NEGATIVE, 30 (1+) mg/dl    POC Urobilinogen, Urine 0.2 0.2, 1.0 EU/DL    Poc Nitrite, Urine NEGATIVE NEGATIVE    POC Leukocytes, Urine TRACE (A) NEGATIVE   POCT pregnancy, urine manually resulted   Result Value Ref Range    Preg Test, Ur Negative Negative   POCT Covid-19 Rapid Antigen   Result Value Ref Range    POC RUBÉN-COV-2 AG  Presumptive negative test for SARS-CoV-2 (no antigen detected)     Presumptive negative test for SARS-CoV-2 (no antigen detected)      No " results found.    Diagnostic study results (if any) were reviewed by Nat Villeda PA-C.    Assessment/Plan   Allergies, medications, history, and pertinent labs/EKGs/Imaging reviewed by Nat Villeda PA-C.     Medical Decision Making  Clinical presentation consistent with UTI. No signs of pyelonephritis, sepsis, systemic illness, or vaginitis. Urine pregnancy negative.  UA +trace leukest --> possibe UTI.  Will send urine for culture and sensitivity.  Pt will be treated with macrobid and contacted if urine culture demonstrates resistance to antibiotic selected for treatment.  Follow up and return precautions discussed with patient prior to discharge.  Has cough but rapid flu and covid negative.  She was initially tachycardiac, and febrile, she was treated with tylenol and tachycardia improved, temp improving as well.  RTC and ER precautions advised.  Possible cough due to viral URI.   At time of discharge patient was clinically well-appearing and HDS for outpatient management. The patient and/or family was educated regarding diagnosis, supportive care, OTC and Rx medications. The patient and/or family was given the opportunity to ask questions prior to discharge.  They verbalized understanding of my discussion of the plans for treatment, expected course, indications to return to  or seek further evaluation in ED, and the need for timely follow up as directed.   They were provided with a work/school excuse if requested.    Orders and Diagnoses  Diagnoses and all orders for this visit:  Acute cystitis without hematuria  -     nitrofurantoin, macrocrystal-monohydrate, (Macrobid) 100 mg capsule; Take 1 capsule (100 mg) by mouth 2 times a day for 5 days.  Fever, unspecified fever cause  -     acetaminophen (Tylenol) tablet 1,000 mg  -     POCT Influenza A/B manually resulted  -     POCT Covid-19 Rapid Antigen  Acute cough  -     POCT Influenza A/B manually resulted  Urinary frequency  -     POCT UA Automated  manually resulted  -     POCT pregnancy, urine manually resulted  Tachycardia  -     acetaminophen (Tylenol) tablet 1,000 mg  Lab test negative for COVID-19 virus      Medical Admin Record      Patient disposition: Home    Electronically signed by Nat Villeda PA-C  6:07 PM

## 2024-10-18 ENCOUNTER — SPECIALTY PHARMACY (OUTPATIENT)
Dept: PHARMACY | Facility: CLINIC | Age: 34
End: 2024-10-18

## 2024-10-19 ENCOUNTER — OFFICE VISIT (OUTPATIENT)
Dept: URGENT CARE | Age: 34
End: 2024-10-19
Payer: COMMERCIAL

## 2024-10-19 VITALS
DIASTOLIC BLOOD PRESSURE: 85 MMHG | BODY MASS INDEX: 30.61 KG/M2 | OXYGEN SATURATION: 96 % | WEIGHT: 195 LBS | TEMPERATURE: 98.3 F | HEART RATE: 98 BPM | HEIGHT: 67 IN | SYSTOLIC BLOOD PRESSURE: 144 MMHG | RESPIRATION RATE: 16 BRPM

## 2024-10-19 DIAGNOSIS — J06.9 ACUTE URI: Primary | ICD-10-CM

## 2024-10-19 RX ORDER — AMOXICILLIN AND CLAVULANATE POTASSIUM 875; 125 MG/1; MG/1
875 TABLET, FILM COATED ORAL 2 TIMES DAILY
Qty: 20 TABLET | Refills: 0 | Status: SHIPPED | OUTPATIENT
Start: 2024-10-19

## 2024-10-19 RX ORDER — PREDNISONE 20 MG/1
40 TABLET ORAL DAILY
Qty: 10 TABLET | Refills: 0 | Status: SHIPPED | OUTPATIENT
Start: 2024-10-19 | End: 2024-10-24

## 2024-10-19 RX ORDER — BENZONATATE 100 MG/1
100 CAPSULE ORAL 3 TIMES DAILY PRN
Qty: 40 CAPSULE | Refills: 0 | Status: SHIPPED | OUTPATIENT
Start: 2024-10-19

## 2024-10-19 ASSESSMENT — ENCOUNTER SYMPTOMS
FEVER: 0
SINUS PRESSURE: 1
CHILLS: 0
CARDIOVASCULAR NEGATIVE: 1
GASTROINTESTINAL NEGATIVE: 1
FATIGUE: 1
COUGH: 1

## 2024-10-19 NOTE — PROGRESS NOTES
"Subjective   Patient ID: Leslee Hernandez is a 34 y.o. female. They present today with a chief complaint of URI (Wet cough last night  sick for 5 days ).    History of Present Illness  Upper Respiratory Infection: Patient complains of symptoms of a URI. Symptoms include congestion and cough. Onset of symptoms was 5 days ago, gradually worsening since that time. She also c/o bilateral ear pressure/pain and facial pain for the past 5 days .  She is drinking moderate amounts of fluids. Evaluation to date: none. Treatment to date: cough suppressants.           History provided by:  Patient      Past Medical History  Allergies as of 10/19/2024    (No Known Allergies)       (Not in a hospital admission)       Past Medical History:   Diagnosis Date    Scaphoid fracture of wrist        No past surgical history on file.     reports that she has never smoked. She has never used smokeless tobacco. She reports that she does not drink alcohol and does not use drugs.    Review of Systems  Review of Systems   Constitutional:  Positive for fatigue. Negative for chills and fever.   HENT:  Positive for congestion and sinus pressure.    Respiratory:  Positive for cough.    Cardiovascular: Negative.    Gastrointestinal: Negative.    All other systems reviewed and are negative.                                 Objective    Vitals:    10/19/24 0841   BP: 144/85   Pulse: 98   Resp: 16   Temp: 36.8 °C (98.3 °F)   SpO2: 96%   Weight: 88.5 kg (195 lb)   Height: 1.702 m (5' 7\")     Patient's last menstrual period was 09/30/2024 (exact date).    Physical Exam  Vitals and nursing note reviewed.   Constitutional:       Appearance: Normal appearance.   HENT:      Head: Atraumatic.      Right Ear: A middle ear effusion is present.      Left Ear: A middle ear effusion is present.      Nose: Mucosal edema, congestion and rhinorrhea present. Rhinorrhea is clear and purulent.      Right Turbinates: Swollen.      Left Turbinates: Swollen.      " Mouth/Throat:      Lips: Pink.      Mouth: Mucous membranes are moist.      Pharynx: Uvula midline. Posterior oropharyngeal erythema present.      Tonsils: 2+ on the right. 1+ on the left.   Eyes:      Extraocular Movements: Extraocular movements intact.      Conjunctiva/sclera: Conjunctivae normal.      Pupils: Pupils are equal, round, and reactive to light.   Cardiovascular:      Rate and Rhythm: Normal rate and regular rhythm.      Pulses: Normal pulses.      Heart sounds: Normal heart sounds.   Pulmonary:      Effort: Pulmonary effort is normal.      Breath sounds: Normal breath sounds.   Musculoskeletal:      Cervical back: Neck supple.   Lymphadenopathy:      Cervical: No cervical adenopathy.   Skin:     General: Skin is warm and dry.      Capillary Refill: Capillary refill takes less than 2 seconds.   Neurological:      Mental Status: She is alert and oriented to person, place, and time.   Psychiatric:         Mood and Affect: Mood normal.         Behavior: Behavior normal.         Thought Content: Thought content normal.         Judgment: Judgment normal.         Procedures    Point of Care Test & Imaging Results from this visit  No results found for this visit on 10/19/24.   No results found.    Diagnostic study results (if any) were reviewed by CHIRAG Ennis.    Assessment/Plan   Allergies, medications, history, and pertinent labs/EKGs/Imaging reviewed by CHIRAG Ennis.     Medical Decision Making  Risks, benefits, and alternatives of the medications and treatment plan prescribed today were discussed, and patient expressed understanding. Plan follow up as discussed or as needed if any worsening symptoms or change in condition. Reinforced red flags including (but not limited to): severe or worsening pain; difficulty swallowing; stiff neck; shortness of breath; coughing or vomiting blood; chest pain; and new or increased fever are indications to go to the Emergency  Department.  At time of discharge patient was clinically well-appearing and HDS for outpatient management. The patient and/or family was educated regarding diagnosis, supportive care, OTC and Rx medications. The patient and/or family was given the opportunity to ask questions prior to discharge.  They verbalized understanding of my discussion of the plans for treatment, expected course, indications to return to  or seek further evaluation in ED, and the need for timely follow up as directed.   They were provided with a work/school excuse if requested. The after-visit summary was given to the patient and care instructions were reviewed with the patient. All questions were answered and the patient verbalized understanding of the plan of care for today.      Orders and Diagnoses  Diagnoses and all orders for this visit:  Acute URI  -     amoxicillin-pot clavulanate (Augmentin) 875-125 mg tablet; Take 1 tablet (875 mg) by mouth 2 times a day.  -     predniSONE (Deltasone) 20 mg tablet; Take 2 tablets (40 mg) by mouth once daily for 5 days.  -     benzonatate (Tessalon Perles) 100 mg capsule; Take 1 capsule (100 mg) by mouth 3 times a day as needed for cough. May take 2 every 8 hours for severe cough and at bedtime. Do not crush or chew.      Medical Admin Record      Patient disposition: Home    Electronically signed by CHIRAG Ennis  9:40 AM

## 2024-10-28 ENCOUNTER — OFFICE VISIT (OUTPATIENT)
Dept: OBSTETRICS AND GYNECOLOGY | Facility: CLINIC | Age: 34
End: 2024-10-28
Payer: COMMERCIAL

## 2024-10-28 VITALS
BODY MASS INDEX: 30.45 KG/M2 | WEIGHT: 194 LBS | SYSTOLIC BLOOD PRESSURE: 127 MMHG | DIASTOLIC BLOOD PRESSURE: 89 MMHG | HEIGHT: 67 IN

## 2024-10-28 DIAGNOSIS — F32.81 PMDD (PREMENSTRUAL DYSPHORIC DISORDER): Primary | ICD-10-CM

## 2024-10-28 DIAGNOSIS — R09.1 PLEURISY: ICD-10-CM

## 2024-10-28 PROCEDURE — 99213 OFFICE O/P EST LOW 20 MIN: CPT | Performed by: OBSTETRICS & GYNECOLOGY

## 2024-10-28 PROCEDURE — 99213 OFFICE O/P EST LOW 20 MIN: CPT | Mod: TH | Performed by: OBSTETRICS & GYNECOLOGY

## 2024-10-28 PROCEDURE — 3008F BODY MASS INDEX DOCD: CPT | Performed by: OBSTETRICS & GYNECOLOGY

## 2024-10-28 PROCEDURE — 1036F TOBACCO NON-USER: CPT | Performed by: OBSTETRICS & GYNECOLOGY

## 2024-10-28 RX ORDER — BACLOFEN 10 MG/1
10 TABLET ORAL 3 TIMES DAILY PRN
Qty: 90 TABLET | Refills: 0 | Status: SHIPPED | OUTPATIENT
Start: 2024-10-28 | End: 2024-11-27

## 2024-11-04 ENCOUNTER — PHARMACY VISIT (OUTPATIENT)
Dept: PHARMACY | Facility: CLINIC | Age: 34
End: 2024-11-04
Payer: MEDICARE

## 2024-11-04 PROCEDURE — RXMED WILLOW AMBULATORY MEDICATION CHARGE

## 2024-11-25 DIAGNOSIS — F33.9 EPISODE OF RECURRENT MAJOR DEPRESSIVE DISORDER, UNSPECIFIED DEPRESSION EPISODE SEVERITY (CMS-HCC): ICD-10-CM

## 2024-11-26 RX ORDER — BUPROPION HYDROCHLORIDE 150 MG/1
150 TABLET ORAL DAILY
Qty: 30 TABLET | Refills: 11 | Status: SHIPPED | OUTPATIENT
Start: 2024-11-26 | End: 2025-11-26

## 2024-11-30 PROCEDURE — RXMED WILLOW AMBULATORY MEDICATION CHARGE

## 2024-12-03 ENCOUNTER — SPECIALTY PHARMACY (OUTPATIENT)
Dept: PHARMACY | Facility: CLINIC | Age: 34
End: 2024-12-03

## 2024-12-04 ENCOUNTER — PHARMACY VISIT (OUTPATIENT)
Dept: PHARMACY | Facility: CLINIC | Age: 34
End: 2024-12-04
Payer: MEDICARE

## 2024-12-06 ENCOUNTER — SPECIALTY PHARMACY (OUTPATIENT)
Dept: PHARMACY | Facility: CLINIC | Age: 34
End: 2024-12-06

## 2024-12-06 NOTE — PROGRESS NOTES
[facility] Clinical Note    Leslee Hernandez is a 34 y.o. female, who is on the specialty pharmacy service for management of:  Migraine Core.    Leslee Hernandez is taking: ajoy.      Leslee was contacted on 12/6/2024 at 12:16 PM for a virtual pharmacy visit with encounter number [id number] from:   [facility]  [address] [address]  Dept: [phone]  Dept Fax: [phone]    Leslee was offered a Telemedicine Video visit or Telephone visit.  Leslee consented to a telephone visit, which was performed.    The most recent encounter visit with the referring prescriber Javier Tucker on 8/22/24 (Date) was reviewed.  Pharmacy will continue to collaborate in the care of this patient with the referring prescriber Javier Tucker.    General Assessment      Impression/Plan  IMPRESSION/PLAN:  Is patient high risk (potential patients:  pregnancy, geriatric, pediatric)?  no  Is laboratory follow-up needed? Per MD  Is a clinical intervention needed? no  Next reassessment date? 3 months   Additional comments:     Refer to the encounter summary report for documentation details about patient counseling and education.      Medication Adherence    The importance of adherence was discussed with the patient and they were advised to take the medication as prescribed by their provider. Patient was encouraged to call their physician's office if they have a question regarding a missed dose.     QOL/Patient Satisfaction  Rate your quality of life on scale of 1-10: 8  Rate your satisfaction with  Specialty Pharmacy on scale of 1-10: 10 - Completely satisfied      Patient was advised to contact the pharmacy if there are any changes to their medication list, including prescriptions, OTC medications, herbal products, or supplements. Patient was advised of [facility]'s dispensing process, refill timeline, contact  information ([phone]), and patient management follow up. Patient confirmed understanding of education conducted during assessment. All patient questions and concerns were addressed to the best of my ability. Patient was encouraged to contact the specialty pharmacy with any questions or concerns.    Confirmed follow-up outreaches are properly scheduled and reviewed goals of therapy with the patient.        Marvin Bear, PharmD

## 2024-12-23 DIAGNOSIS — F33.1 MODERATE EPISODE OF RECURRENT MAJOR DEPRESSIVE DISORDER: ICD-10-CM

## 2024-12-23 RX ORDER — ESCITALOPRAM OXALATE 20 MG/1
20 TABLET ORAL DAILY
Qty: 90 TABLET | Refills: 3 | Status: SHIPPED | OUTPATIENT
Start: 2024-12-23 | End: 2025-12-23

## 2024-12-30 ENCOUNTER — SPECIALTY PHARMACY (OUTPATIENT)
Dept: PHARMACY | Facility: CLINIC | Age: 34
End: 2024-12-30

## 2024-12-30 PROCEDURE — RXMED WILLOW AMBULATORY MEDICATION CHARGE

## 2025-01-03 ENCOUNTER — PHARMACY VISIT (OUTPATIENT)
Dept: PHARMACY | Facility: CLINIC | Age: 35
End: 2025-01-03
Payer: MEDICARE

## 2025-01-07 ENCOUNTER — TELEMEDICINE (OUTPATIENT)
Dept: NEUROLOGY | Facility: CLINIC | Age: 35
End: 2025-01-07
Payer: COMMERCIAL

## 2025-01-07 DIAGNOSIS — G43.009 MIGRAINE WITHOUT AURA, NOT INTRACTABLE, WITHOUT STATUS MIGRAINOSUS: ICD-10-CM

## 2025-01-07 PROCEDURE — 99214 OFFICE O/P EST MOD 30 MIN: CPT | Mod: 95 | Performed by: STUDENT IN AN ORGANIZED HEALTH CARE EDUCATION/TRAINING PROGRAM

## 2025-01-07 PROCEDURE — 99214 OFFICE O/P EST MOD 30 MIN: CPT | Performed by: STUDENT IN AN ORGANIZED HEALTH CARE EDUCATION/TRAINING PROGRAM

## 2025-01-07 RX ORDER — ELETRIPTAN HYDROBROMIDE 40 MG/1
40 TABLET, FILM COATED ORAL ONCE AS NEEDED
Qty: 9 TABLET | Refills: 5 | Status: SHIPPED | OUTPATIENT
Start: 2025-01-07 | End: 2025-02-06

## 2025-01-07 RX ORDER — FREMANEZUMAB-VFRM 225 MG/1.5ML
225 INJECTION SUBCUTANEOUS
Qty: 1.5 ML | Refills: 11 | Status: SHIPPED | OUTPATIENT
Start: 2025-01-07

## 2025-01-07 NOTE — PROGRESS NOTES
Virtual or Telephone Consent    An interactive audio and video telecommunication system which permits real time communications between the patient (at the originating site) and provider (at the distant site) was utilized to provide this telehealth service.   Verbal consent was requested and obtained from Leslee Hernandez on this date, 01/07/25 for a telehealth visit.     Subjective   Leslee Hernandez is a 34 y.o.   female who is being followed for episodic migraine without aura.     Since last seen, patient states that headaches continue to be well controlled with Ajovy. Having 2/30 HA days per month. Eletriptan resolves breakthrough headaches. No side effects to Ajovy. Describes chest tightness with the eletriptan the first time, however subsequently no side effect. Eletriptan is very effective, but occasionally uses excedrin which is also effective.     Current Outpatient Medications   Medication Instructions    Ajovy Autoinjector 225 mg, subcutaneous, Every 28 days    amoxicillin-pot clavulanate (Augmentin) 875-125 mg tablet 875 mg, oral, 2 times daily    baclofen (LIORESAL) 10 mg, oral, 3 times daily PRN    benzonatate (TESSALON PERLES) 100 mg, oral, 3 times daily PRN, May take 2 every 8 hours for severe cough and at bedtime. Do not crush or chew.    buPROPion XL (WELLBUTRIN XL) 150 mg, oral, Daily, Do not crush, chew, or split.    eletriptan (RELPAX) 40 mg, oral, Once as needed, May repeat in 2 hours if unresolved. Do not exceed 80 mg in 24 hours.    escitalopram (LEXAPRO) 20 mg, oral, Daily    magnesium 250 mg tablet Take by mouth.    magnesium oxide (Mag-Ox) 250 mg magnesium tablet Take by mouth.    riboflavin (vitamin B2) 100 mg tablet tablet Take by mouth.       Assessment/Plan   Patient with episodic migraine w/o aura. Headaches well controlled with Ajovy and eletriptan without side effect. Will refill medications for 1 year with subsequent refills per PCP.     - continue Ajovy 225mg monthly  -  continue eletriptan 40mg PRN  - follow up PRN

## 2025-01-28 ENCOUNTER — SPECIALTY PHARMACY (OUTPATIENT)
Dept: PHARMACY | Facility: CLINIC | Age: 35
End: 2025-01-28

## 2025-01-28 PROCEDURE — RXMED WILLOW AMBULATORY MEDICATION CHARGE

## 2025-02-04 ENCOUNTER — PHARMACY VISIT (OUTPATIENT)
Dept: PHARMACY | Facility: CLINIC | Age: 35
End: 2025-02-04
Payer: MEDICARE

## 2025-02-26 PROCEDURE — RXMED WILLOW AMBULATORY MEDICATION CHARGE

## 2025-02-27 ENCOUNTER — PHARMACY VISIT (OUTPATIENT)
Dept: PHARMACY | Facility: CLINIC | Age: 35
End: 2025-02-27
Payer: MEDICARE

## 2025-03-26 PROCEDURE — RXMED WILLOW AMBULATORY MEDICATION CHARGE

## 2025-03-29 ENCOUNTER — PHARMACY VISIT (OUTPATIENT)
Dept: PHARMACY | Facility: CLINIC | Age: 35
End: 2025-03-29
Payer: MEDICARE

## 2025-04-02 ENCOUNTER — OFFICE VISIT (OUTPATIENT)
Dept: PRIMARY CARE | Facility: CLINIC | Age: 35
End: 2025-04-02
Payer: COMMERCIAL

## 2025-04-02 VITALS
HEIGHT: 67 IN | DIASTOLIC BLOOD PRESSURE: 85 MMHG | WEIGHT: 195 LBS | RESPIRATION RATE: 16 BRPM | TEMPERATURE: 97.6 F | SYSTOLIC BLOOD PRESSURE: 122 MMHG | HEART RATE: 83 BPM | OXYGEN SATURATION: 97 % | BODY MASS INDEX: 30.61 KG/M2

## 2025-04-02 DIAGNOSIS — K21.9 GASTROESOPHAGEAL REFLUX DISEASE, UNSPECIFIED WHETHER ESOPHAGITIS PRESENT: ICD-10-CM

## 2025-04-02 DIAGNOSIS — Z00.00 HEALTH CARE MAINTENANCE: Primary | ICD-10-CM

## 2025-04-02 PROCEDURE — 99385 PREV VISIT NEW AGE 18-39: CPT | Performed by: FAMILY MEDICINE

## 2025-04-02 PROCEDURE — 36415 COLL VENOUS BLD VENIPUNCTURE: CPT | Performed by: FAMILY MEDICINE

## 2025-04-02 PROCEDURE — 3008F BODY MASS INDEX DOCD: CPT | Performed by: FAMILY MEDICINE

## 2025-04-02 PROCEDURE — 1036F TOBACCO NON-USER: CPT | Performed by: FAMILY MEDICINE

## 2025-04-02 RX ORDER — OMEPRAZOLE 20 MG/1
20 CAPSULE, DELAYED RELEASE ORAL DAILY
Qty: 30 CAPSULE | Refills: 5 | Status: SHIPPED | OUTPATIENT
Start: 2025-04-02 | End: 2025-09-29

## 2025-04-02 ASSESSMENT — PATIENT HEALTH QUESTIONNAIRE - PHQ9
2. FEELING DOWN, DEPRESSED OR HOPELESS: NOT AT ALL
SUM OF ALL RESPONSES TO PHQ9 QUESTIONS 1 AND 2: 0
1. LITTLE INTEREST OR PLEASURE IN DOING THINGS: NOT AT ALL

## 2025-04-02 ASSESSMENT — ENCOUNTER SYMPTOMS
OCCASIONAL FEELINGS OF UNSTEADINESS: 0
LOSS OF SENSATION IN FEET: 0
DEPRESSION: 0

## 2025-04-02 ASSESSMENT — PAIN SCALES - GENERAL: PAINLEVEL_OUTOF10: 0-NO PAIN

## 2025-04-02 NOTE — PROGRESS NOTES
Subjective   Patient ID: Leslee Hernandez is a 35 y.o. female with a history of PMDD, migraines, who presents for establishing care and a chief complaint of acid reflux.     HPI  Ms. Hernandez reports a 2 year history of acid reflux with worsening symptoms within the last 6 months. She states she usually takes Tums, but in the past 2 months, started taking Pepsid. The reflux mostly happens in the afternoon between lunch and dinner when she hasn't eaten, and is also triggered by tomatoes and spicy food. Otherwise no complaints.     Health maintenance  - She needs a routine physical for work  - No concerns regarding diet  - Plans to increase exercise frequency  - Up to date on pap and on vaccinations    ROS  Negative     PMH  -Migraine without aura, diagnosed 2014, controlled with medication   -PMDD diagnosed 2019, managed with anti-depressants  -Scaphoid fracture 2024    Medications  -Buproprion HCL (tablet extended release 24 hours), 150mg, once daily  -Eletriptan Hydrobromide 50mg as needed for migraine  -Escitalopram oxalate 20mg, once daily  -Fremanezumab-vfrm auto-infection, 225mg/28 days  -Magnesium 250 daily   -Riboflavin 100mg daily    Surgical History  -Reduction, open fixation of scaphoid with bone graft 2024     Hospitalizations   -None     Past Procedures   -None     Allergies  -None listed    Family History  -Mom with UC or Crohn's, HTN  -Maternal grandparents with HTN  -Father healthy  -Paternal grandparents healthy   -Sister healthy     Social History  Tobacco: Denies   ETOH: Rare use  Sexual hx: 1 male partner, monogamous, for past 6 years, use condoms   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     Appearance: Normal appearance.   Cardiovascular:      Rate and Rhythm: Normal rate and regular rhythm.      Heart sounds: Normal heart sounds. No murmur heard.     No friction rub. No gallop.   Pulmonary:      Effort: Pulmonary effort is normal. No respiratory distress.      Breath sounds: Normal breath sounds.   Abdominal:      General: Abdomen is flat. Bowel sounds are normal.      Palpations: Abdomen is soft.   Skin:     General: Skin is warm and dry.   Neurological:      Mental Status: She is alert and oriented to person, place, and time.   Psychiatric:         Mood and Affect: Mood normal.         Behavior: Behavior normal.       Assessment/Plan     Leslee Hernandez is a 35 y.o. female with a history of PMDD and migraines who presents for Establish Care and with a chief complaint of acid reflux. The epigastric discomfort is most likely GERD, however a differential diagnosis includes a gastric ulcer secondary to ibuprofen use. Ms. Hernandez disclosed she used to take a lot of ibuprofen for painful menstrual cramps. If the reflux does not improve with the Omeprazole, then an EGD may be necessary to determine the source. Otherwise, the patient is doing well, plan to follow up in 6 months.     #Acid reflux, unspecified  -Omeprazole, 20mg daily for 4 weeks, then follow up to discuss if symptoms improved    #PMDD  -Continue to follow with OBGYN    #Migraines  -Neurologist asked for PCP to take over prescription of Eletriptan Hydrobromide and Fremanezumab-vfrm auto-infection    #Health maintnenace  - labs as ordered  - Up to date on vaccinations  - Anticipatory guidance provided     Maria Ohara, MARIO     Patient was seen and examined by myself in conjunction with medical student. I have edited note above. John Shannon

## 2025-04-19 LAB
ANION GAP SERPL CALCULATED.4IONS-SCNC: 9 MMOL/L (CALC) (ref 7–17)
BUN SERPL-MCNC: 12 MG/DL (ref 7–25)
BUN/CREAT SERPL: NORMAL (CALC) (ref 6–22)
CALCIUM SERPL-MCNC: 8.8 MG/DL (ref 8.6–10.2)
CHLORIDE SERPL-SCNC: 107 MMOL/L (ref 98–110)
CHOLEST SERPL-MCNC: 148 MG/DL
CHOLEST/HDLC SERPL: 3 (CALC)
CO2 SERPL-SCNC: 21 MMOL/L (ref 20–32)
CREAT SERPL-MCNC: 0.73 MG/DL (ref 0.5–0.97)
EGFRCR SERPLBLD CKD-EPI 2021: 111 ML/MIN/1.73M2
ERYTHROCYTE [DISTWIDTH] IN BLOOD BY AUTOMATED COUNT: 13.3 % (ref 11–15)
GLUCOSE SERPL-MCNC: 86 MG/DL (ref 65–99)
HCT VFR BLD AUTO: 38.7 % (ref 35–45)
HDLC SERPL-MCNC: 49 MG/DL
HGB BLD-MCNC: 12.5 G/DL (ref 11.7–15.5)
LDLC SERPL CALC-MCNC: 88 MG/DL (CALC)
MCH RBC QN AUTO: 26.3 PG (ref 27–33)
MCHC RBC AUTO-ENTMCNC: 32.3 G/DL (ref 32–36)
MCV RBC AUTO: 81.5 FL (ref 80–100)
NONHDLC SERPL-MCNC: 99 MG/DL (CALC)
PLATELET # BLD AUTO: 308 THOUSAND/UL (ref 140–400)
PMV BLD REES-ECKER: 10.5 FL (ref 7.5–12.5)
POTASSIUM SERPL-SCNC: 3.8 MMOL/L (ref 3.5–5.3)
RBC # BLD AUTO: 4.75 MILLION/UL (ref 3.8–5.1)
SODIUM SERPL-SCNC: 137 MMOL/L (ref 135–146)
TRIGL SERPL-MCNC: 37 MG/DL
WBC # BLD AUTO: 5.5 THOUSAND/UL (ref 3.8–10.8)

## 2025-04-21 ENCOUNTER — OFFICE VISIT (OUTPATIENT)
Dept: OBSTETRICS AND GYNECOLOGY | Facility: CLINIC | Age: 35
End: 2025-04-21
Payer: COMMERCIAL

## 2025-04-21 VITALS
HEIGHT: 67 IN | BODY MASS INDEX: 30.54 KG/M2 | DIASTOLIC BLOOD PRESSURE: 84 MMHG | SYSTOLIC BLOOD PRESSURE: 126 MMHG | HEART RATE: 106 BPM

## 2025-04-21 DIAGNOSIS — N94.2 VAGINISMUS: ICD-10-CM

## 2025-04-21 DIAGNOSIS — F33.1 MODERATE EPISODE OF RECURRENT MAJOR DEPRESSIVE DISORDER: ICD-10-CM

## 2025-04-21 DIAGNOSIS — F33.9 EPISODE OF RECURRENT MAJOR DEPRESSIVE DISORDER, UNSPECIFIED DEPRESSION EPISODE SEVERITY: ICD-10-CM

## 2025-04-21 DIAGNOSIS — Z31.62 ENCOUNTER FOR FERTILITY PRESERVATION COUNSELING: ICD-10-CM

## 2025-04-21 DIAGNOSIS — N64.52 NIPPLE DISCHARGE: ICD-10-CM

## 2025-04-21 DIAGNOSIS — Z01.411 ENCOUNTER FOR WELL WOMAN EXAM WITH ABNORMAL FINDINGS: Primary | ICD-10-CM

## 2025-04-21 PROCEDURE — 99214 OFFICE O/P EST MOD 30 MIN: CPT | Performed by: OBSTETRICS & GYNECOLOGY

## 2025-04-21 PROCEDURE — 1036F TOBACCO NON-USER: CPT | Performed by: OBSTETRICS & GYNECOLOGY

## 2025-04-21 PROCEDURE — 99395 PREV VISIT EST AGE 18-39: CPT | Performed by: OBSTETRICS & GYNECOLOGY

## 2025-04-21 PROCEDURE — 99214 OFFICE O/P EST MOD 30 MIN: CPT | Mod: 25 | Performed by: OBSTETRICS & GYNECOLOGY

## 2025-04-21 RX ORDER — ESCITALOPRAM OXALATE 20 MG/1
20 TABLET ORAL DAILY
Qty: 90 TABLET | Refills: 3 | Status: SHIPPED | OUTPATIENT
Start: 2025-04-21 | End: 2026-04-21

## 2025-04-21 RX ORDER — BUPROPION HYDROCHLORIDE 150 MG/1
TABLET ORAL
Qty: 42 TABLET | Refills: 11 | Status: SHIPPED | OUTPATIENT
Start: 2025-04-21

## 2025-04-21 ASSESSMENT — ENCOUNTER SYMPTOMS
MUSCULOSKELETAL NEGATIVE: 0
RESPIRATORY NEGATIVE: 0
PSYCHIATRIC NEGATIVE: 0
ENDOCRINE NEGATIVE: 0
CONSTITUTIONAL NEGATIVE: 0
ALLERGIC/IMMUNOLOGIC NEGATIVE: 0
CARDIOVASCULAR NEGATIVE: 0
HEMATOLOGIC/LYMPHATIC NEGATIVE: 0
EYES NEGATIVE: 0
GASTROINTESTINAL NEGATIVE: 0
NEUROLOGICAL NEGATIVE: 0

## 2025-04-21 ASSESSMENT — PATIENT HEALTH QUESTIONNAIRE - PHQ9
1. LITTLE INTEREST OR PLEASURE IN DOING THINGS: NOT AT ALL
2. FEELING DOWN, DEPRESSED OR HOPELESS: NOT AT ALL
SUM OF ALL RESPONSES TO PHQ9 QUESTIONS 1 AND 2: 0

## 2025-04-21 ASSESSMENT — COLUMBIA-SUICIDE SEVERITY RATING SCALE - C-SSRS
2. HAVE YOU ACTUALLY HAD ANY THOUGHTS OF KILLING YOURSELF?: NO
1. IN THE PAST MONTH, HAVE YOU WISHED YOU WERE DEAD OR WISHED YOU COULD GO TO SLEEP AND NOT WAKE UP?: NO
6. HAVE YOU EVER DONE ANYTHING, STARTED TO DO ANYTHING, OR PREPARED TO DO ANYTHING TO END YOUR LIFE?: NO

## 2025-04-21 NOTE — PROGRESS NOTES
"Assessment   Leslee was seen today for annual exam.  Diagnoses and all orders for this visit:  Encounter for well woman exam with abnormal findings (Primary)  Moderate episode of recurrent major depressive disorder  -     escitalopram (Lexapro) 20 mg tablet; Take 1 tablet (20 mg) by mouth once daily.  Episode of recurrent major depressive disorder, unspecified depression episode severity  -     buPROPion XL (Wellbutrin XL) 150 mg 24 hr tablet; Take 150mg XL daily, increase to 300mg XL daily during luteal phase (10 days before period and 2 days into period).  Encounter for fertility preservation counseling  -     Referral to Reproductive Endocrinology; Future  Vaginismus  -     Referral to Physical Therapy; Future  -     Referral to Urology; Future  Nipple discharge  -     BI mammo bilateral diagnostic tomosynthesis; Future    FU 1 year. Pt will likely follow me to [facility].    Leticia Corbett MD    Subjective   36 YO who presents for an annual gynecological exam. PCP = Ese Shannon MD     APE Concerns: none     Other Concerns:  1. PMDD - on Wellbutrin 150mg XL and Lexapro 20mg daily. Still a bit down before her period, can incr to Wellbutrin to 300mg XL during luteal phase, as she felt addition of Wellbutrin was pretty helpful for her mood.  2. Notes bilateral expressible green-brown discharge (not spontaneous). Never bloody. Would like breast imaging as she has a hx of breasts cysts.     OBHx: G0. Would like to discuss fertility preservation with YASMINE. BF not ready yet to have children.     GynHx:  Menstrual Hx: Menarche 12, cycles q28, last 7 days, 3 heavy days, \"big cramps\" 2 days out of the period. No IMS. Mod pain. Hx severe PMDD.  STIs: denies h/o  Contraception: condoms. Hx flattened affect with OCPs in the past  Sexual History/Complaints: Men, monogamous, hx vaginismus improved with pelvic PT but still a bit painful during IC.      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  -Mom with UC or Crohn's, HTN  -Maternal grandparents with HTN    Objective   /84   Pulse 106   Ht 1.702 m (5' 7\")   LMP 04/07/2025 (Exact Date)   BMI 30.54 kg/m²      General:   Alert and oriented, in no acute distress   Skin: No significant acne. No hirsutism.   Neck: Supple. No visible thyromegaly.    Breast/Axilla: Normal to palpation bilaterally without masses, skin changes, or nipple discharge.    Abdomen: Soft, non-tender, without masses or organomegaly   Vulva: Normal architecture without erythema, masses, or lesions.    Vagina: Normal mucosa without lesions, masses, or atrophy. No abnormal vaginal discharge.    Cervix: Normal without masses, lesions, or signs of cervicitis.    Uterus: Normal mobile, non-enlarged uterus    Adnexa: Normal without masses or lesions   Pelvic Floor No POP noted. No high tone pelvic floor during my exam. Pt has a small bony pelvic outlet. I can imagine she might have some vaginismus with penetration >2.5cm in width.   Psych Normal affect. Normal mood.       "

## 2025-04-23 PROCEDURE — RXMED WILLOW AMBULATORY MEDICATION CHARGE

## 2025-04-25 ENCOUNTER — HOSPITAL ENCOUNTER (OUTPATIENT)
Dept: RADIOLOGY | Facility: CLINIC | Age: 35
Discharge: HOME | End: 2025-04-25
Payer: COMMERCIAL

## 2025-04-25 VITALS — BODY MASS INDEX: 30.62 KG/M2 | HEIGHT: 67 IN | WEIGHT: 195.11 LBS

## 2025-04-25 DIAGNOSIS — N64.52 NIPPLE DISCHARGE: ICD-10-CM

## 2025-04-25 PROCEDURE — 77062 BREAST TOMOSYNTHESIS BI: CPT

## 2025-04-25 PROCEDURE — 76642 ULTRASOUND BREAST LIMITED: CPT

## 2025-04-26 ENCOUNTER — PHARMACY VISIT (OUTPATIENT)
Dept: PHARMACY | Facility: CLINIC | Age: 35
End: 2025-04-26
Payer: MEDICARE

## 2025-04-26 ENCOUNTER — PHARMACY VISIT (OUTPATIENT)
Dept: PHARMACY | Facility: CLINIC | Age: 35
End: 2025-04-26
Payer: COMMERCIAL

## 2025-04-28 DIAGNOSIS — N64.52 NIPPLE DISCHARGE: Primary | ICD-10-CM

## 2025-05-02 LAB — PROLACTIN SERPL-MCNC: 15.2 NG/ML

## 2025-05-19 PROCEDURE — RXMED WILLOW AMBULATORY MEDICATION CHARGE

## 2025-05-20 ENCOUNTER — PHARMACY VISIT (OUTPATIENT)
Dept: PHARMACY | Facility: CLINIC | Age: 35
End: 2025-05-20
Payer: MEDICARE

## 2025-06-05 ENCOUNTER — EVALUATION (OUTPATIENT)
Dept: PHYSICAL THERAPY | Facility: CLINIC | Age: 35
End: 2025-06-05
Payer: COMMERCIAL

## 2025-06-05 DIAGNOSIS — M62.838 MUSCLE SPASM: Primary | ICD-10-CM

## 2025-06-05 DIAGNOSIS — N94.2 VAGINISMUS: ICD-10-CM

## 2025-06-05 DIAGNOSIS — M62.81 MUSCLE WEAKNESS: ICD-10-CM

## 2025-06-05 DIAGNOSIS — N94.10 DYSPAREUNIA IN FEMALE: ICD-10-CM

## 2025-06-05 PROCEDURE — 97162 PT EVAL MOD COMPLEX 30 MIN: CPT | Mod: GP

## 2025-06-05 PROCEDURE — 97112 NEUROMUSCULAR REEDUCATION: CPT | Mod: GP

## 2025-06-05 ASSESSMENT — PAIN SCALES - GENERAL: PAINLEVEL_OUTOF10: 0 - NO PAIN

## 2025-06-05 ASSESSMENT — PAIN - FUNCTIONAL ASSESSMENT: PAIN_FUNCTIONAL_ASSESSMENT: 0-10

## 2025-06-05 NOTE — PROGRESS NOTES
Physical Therapy    Physical Therapy Evaluation    Patient Name: Leslee Hernandez  MRN: [id number]  Today's Date: 2025  Last name and  confirmed verbally by patient.    Time Entry:   Time Calculation  Start Time: 1010  Stop Time: 1105  Time Calculation (min): 55 min  PT Evaluation Time Entry  PT Evaluation (Moderate) Time Entry: 30  PT Therapeutic Procedures Time Entry  Neuromuscular Re-Education Time Entry: 25                    Reason for Visit:  Referred by: Leticia Corbett  Referral diagnosis: N94.2 (ICD-10-CM) - Vaginismus     Insurance:  Visit: #1  Authorized:  BENEFIT / NO AUTH  /10% COINS / 10 VISITS THEN TO REVIEW / $500 DED / $6600 OOP / [employer] / [employer] # [id number]   Payor/Plan: Payor: [employer] [facility] / Plan: [employer] / Product Type: *No Product type* /     Current Problem  1. Muscle spasm  Follow Up In Physical Therapy      2. Dyspareunia in female  Follow Up In Physical Therapy      3. Vaginismus  Referral to Physical Therapy    Follow Up In Physical Therapy      4. Muscle weakness  Follow Up In Physical Therapy          Assessment   Leslee Hernandez presents with chief complaint of dyspareunia. Functional limitations include sexual function, bowel function. Impairments include strength, range of motion, flexibility, tissue tenderness, independence in exercise. Patient demonstrates decreased mobility of bilateral pelvic floor into lengthening, decreased strength of left pelvic floor, and hyposensitivity of pelvic floor musculature overall with elevated tone noted. Decreased flexibility of bilateral hip flexors, adductors, and hamstrings with mild strength deficits of bilateral hip abduction and extension also noted. Patient likely to benefit from skilled physical therapy to return to prior level of function and maximize functional outcome.    Plan  Treatment/Interventions: Biofeedback, Blood flow restriction therapy, Dry needling, Education/ Instruction,  "Electrical stimulation, Gait training, Manual therapy, Neuromuscular re-education, Self care/ home management, Taping techniques, Therapeutic activities, Therapeutic exercises, Ultrasound  PT Plan: Skilled PT  PT Frequency: 1 time per week  Duration: 12 weeks  Onset Date: 06/05/24  Rehab Potential: Good  Plan of Care Agreement: Patient    Next Visit Plan: orgasm difficulty/pain?, test \"vaginismus\" with dilator, internal trigger point release/review wand, review vibrator use at home for hypo sensitivity, adductor release, stretches hip flexor/adductor/HS, hip abduction/extension strengthening as able, assess transverse abdominis, squatty potty handout, review education PRN      Goals:  ST Goals (4 visits):  Patient will demonstrate independence and report adherence with home exercise program (HEP) to facilitate independent rehab program upon discharge to maximize functional gains.  Patient will be able to isolate pelvic floor and contract/relax on cue to improve lumbopelvic stabilization and decreased pain  LT Goals (12 visits):  Patient will report 75% improvement in pain with sexual intercourse  Patient will demonstrate no trigger points of pelvic floor musculature for improved mobility and decreased pain  NIH CPSI will improve by at least 6 points. Baseline: 12      Subjective   Date of Onset: years  Chief Complaint: Painful intercourse since started having sex. Had pelvic floor physical therapy in 2021. Did mostly stretching adductors/happy baby. Overactive pelvic floor. Seems like a lot of it tied to tight adductors. Spent a lot of time dry needling. Seems like back to where was before had done pelvic floor physical therapy so wants to revisit. Just started running again a few months ago    Orthopedic: back pain August 2022 and reoccurred 2x after that, went to PT and has been better; gets tight piriformis on R    Relevant PMH: Depression, migraine  Relevant PSH: None  Red flags: Do you have any of the " following? no   Fever/chills, unexplained weight changes, dizziness/fainting, unexplained change in bowel or bladder functions, unexplained malaise or muscle weakness, night pain/sweats, numbness or tingling  Occupation: speech therapist  Exercise: maybe runs 1x a week 1-2 miles  Patient stated goals for treatment: have sex like regular people    Bladder Screen:  Leakage (amount, frequency, protection, activity): when sick with coughing      Bowel Screen:  Frequency: varies  Constipation/straining: sometimes/sometimes  Urgency: sometimes      Sexual health screen:  Currently sexually active: yes  Pain with intercourse: more superficial, used to feel like hitting a wall, not that severe anymore but has to actively relax     Barriers Impacting Care:  Chronicity of condition    Precautions:  Precautions  STEADI Fall Risk Score (The score of 4 or more indicates an increased risk of falling): 0      Pain:  Pain Assessment: 0-10  0-10 (Numeric) Pain Score: 0 - No pain        Objective   Additional Verbalized Informed Consent Explained by Treating Therapist: Yes  Patient Understanding of Examination Techniques:  The patient has been informed about the various examination techniques to be utilized today, including both external and internal methods. A detailed explanation of the purpose and benefits of these techniques has been provided, ensuring the patient has a clear understanding of each aspect.    Consent and Autonomy:  The patient understands that consent for the examination is an ongoing process. They have the right to withdraw consent and terminate the examination at any point should they feel uncomfortable or wish to discontinue for any reason.  The patient acknowledges the importance of communication during the examination and agrees to promptly inform the examiner if they experience any discomfort at any time. This will allow for immediate adjustments to be made to ensure their comfort and safety.    Alternatives  to Internal Examination:  The patient is aware of alternative options to internal examinations, includin. Education and Instruction: Providing information and guidance without performing an internal exam.  2. External Examination: Conducting a pelvic exam while the patient remains clothed or partially clothed.  3. No Examination: The option to forgo the examination entirely if the patient chooses.  The patient has confirmed their understanding of these alternatives and their implications.    Conclusion:  The patient’s consent is based on a thorough understanding of the risks, benefits, and purposes of the examination techniques discussed today. They have been encouraged to ask questions and express any concerns they may have, ensuring an informed and collaborative approach to their care.    Patient consents to internal assessment and does not request presence of chaperone    Ortho:  Posture:  Forward head, rounded shoulders, slight flat back    Gait::  WNL    Lumbar AROM:  Flex: mod limitation  Ext: WNL  SB R: WNL  SB L: WNL  Rotation R: WNL  Rotation L: WNL     SLS:   R: 10s+  L: 10s+    Flexibility:  Hamstrings: R -15, L -15  Hip flexors: mod limitation bilateral   Hip external rotation: within normal limits bilateral  Hip internal rotation: mod limitation bilateral  Quadriceps: within normal limits bilateral  Adductors: mod limitation R, min limitation L    Isometric Abdominal Contraction:  Oblique dominant     Strength:   Hip flexion: R 4+/5, L 4+/5  Hip internal rotation: R 4+/5, L 4+/5  Hip external rotation: R 4/5, L 4/5  Hip abduction: R 4/5, L 4/5  Hip extension: R 4/5, L 4/5      Pelvic Floor:    Observation: decreased perineal movement on contract-relax-bulge      Range of motion:  Voluntary contraction: yes on right, no on left  Voluntary relaxation: no, decreased range of motion on bulge  Involuntary contraction: no  Paradoxical contraction: no    Strength/coordination: (on right side)  P: 4/5   E:  8 sec hold   R: 1 reps   F: 4 in 10s    Internal palpation:   Some tenderness bilateral levator ani, tension throughout with mild hyperactivity    Tonicity:   mod    Rib cage mobility:  Diaphragmatic breathing fair    Outcome Measures:  Other Measures  Other Outcome Measures: Female NIH-CPSI = 12     TREATMENT  Neuromuscular reeducation:  Educated patient on pelvic floor anatomy and relationship to patient's specific diagnosis  Educated patient on role of physical therapy and plan of care  Educated patient on muscle tension vs weakness vs incoordination  Instructed proper diaphragmatic breathing form  Instructed proper pelvic floor contraction and lengthening  *diaphragmatic breathing with pelvic floor lengthening in supine hooklying 3x10 daily  *Happy baby with diaphragmatic breathing and pelvic floor lengthening x10 breaths  *Child's pose with diaphragmatic breathing and pelvic floor lengthening x10 breaths  Education provided on proper elimination position, squatty potty, and toileting mechanics for improved muscle relaxation and decreased straining with bowel movements.  *Self internal pelvic floor stretch/release at home using wand    Education:  *HEP: Patient verbalizes and demonstrates understanding of home exercises. Patient will contact PT with questions or if condition worsens.    Access Code: [email or web address]  URL: [email or web address]  Date: 06/05/2025  Prepared by: Tosha Villarreal    Exercises  - Supine Diaphragmatic Breathing with Pelvic Floor Lengthening  - 3 x daily - 7 x weekly - 1 sets - 10 breath hold  - Happy Baby with Pelvic Floor Lengthening  - 1 x daily - 7 x weekly - 1 sets - 10 breath hold  - Diaphragmatic Breathing in Child's Pose with Pelvic Floor Relaxation  - 1 x daily - 7 x weekly - 1 sets - 10 breath hold    Tosha Villarreal, PT, DPT

## 2025-06-16 PROCEDURE — RXMED WILLOW AMBULATORY MEDICATION CHARGE

## 2025-06-18 ENCOUNTER — PHARMACY VISIT (OUTPATIENT)
Dept: PHARMACY | Facility: CLINIC | Age: 35
End: 2025-06-18
Payer: MEDICARE

## 2025-06-19 ENCOUNTER — TREATMENT (OUTPATIENT)
Dept: PHYSICAL THERAPY | Facility: CLINIC | Age: 35
End: 2025-06-19
Payer: COMMERCIAL

## 2025-06-19 DIAGNOSIS — N94.2 VAGINISMUS: ICD-10-CM

## 2025-06-19 DIAGNOSIS — M62.838 MUSCLE SPASM: ICD-10-CM

## 2025-06-19 DIAGNOSIS — M62.81 MUSCLE WEAKNESS: ICD-10-CM

## 2025-06-19 DIAGNOSIS — N94.10 DYSPAREUNIA IN FEMALE: ICD-10-CM

## 2025-06-19 PROCEDURE — 97140 MANUAL THERAPY 1/> REGIONS: CPT | Mod: GP

## 2025-06-19 ASSESSMENT — PAIN - FUNCTIONAL ASSESSMENT: PAIN_FUNCTIONAL_ASSESSMENT: 0-10

## 2025-06-19 NOTE — PROGRESS NOTES
Physical Therapy    Physical Therapy Treatment    Patient Name: Leslee Hernandez  MRN: [id number]  Today's Date: 6/19/2025    Time Entry:   Time Calculation  Start Time: 1005  Stop Time: 1100  Time Calculation (min): 55 min     PT Therapeutic Procedures Time Entry  Manual Therapy Time Entry: 55                    Insurance:  Visit: #2  Authorized: 2025 BENEFIT / NO AUTH  /10% COINS / 10 VISITS THEN TO REVIEW / $500 DED / $6600 OOP / [employer] / [employer] # [id number]   Payor/Plan: Payor: [employer] [facility] / Plan: [employer] MED / Product Type: *No Product type* /     Current Problem  1. Muscle spasm  Follow Up In Physical Therapy      2. Dyspareunia in female  Follow Up In Physical Therapy      3. Vaginismus  Follow Up In Physical Therapy      4. Muscle weakness  Follow Up In Physical Therapy          Assessment   Patient demonstrates tension of right adductors with improvement after manual work. No trigger points noted today but difficulty with inserting size 4 and 5 dilators with burning discomfort. Patient to start dilator training at home. Patient will continue to benefit from skilled physical therapy to improve function and meet therapy goals.    Plan  Next Visit Plan: oh-nut?, internal trigger point release/review dilator and or wand, adductor release, stretches hip flexor/adductor/HS, hip abduction/extension strengthening as able, assess transverse abdominis, squatty potty handout, review education PRN      Goals:  ST Goals (4 visits):  Patient will demonstrate independence and report adherence with home exercise program (HEP) to facilitate independent rehab program upon discharge to maximize functional gains.  Patient will be able to isolate pelvic floor and contract/relax on cue to improve lumbopelvic stabilization and decreased pain  LT Goals (12 visits):  Patient will report 75% improvement in pain with sexual intercourse  Patient will demonstrate no trigger points of pelvic floor  musculature for improved mobility and decreased pain  NIH CPSI will improve by at least 6 points. Baseline: 12    Subjective   Has been doing exercises every day. Pelvic floor relaxation still takes a lot of effort. Used wand 1-2x. Usually finds trigger point deeper around 7 o'clock. Also a sharper spot more shallow.     Tried vibrator 1x for 5 minutes, thinks helped with hyposensitivity. Notes difficulty achieving orgasm, olu with partner    Pain:  Pain Assessment: 0-10  0-10 (Numeric) Pain Score:  (Pain level not discussed)           Objective   Additional Verbalized Informed Consent Explained by Treating Therapist: Yes  Patient Understanding of Examination Techniques:  The patient has been informed about the various examination techniques to be utilized today, including both external and internal methods. A detailed explanation of the purpose and benefits of these techniques has been provided, ensuring the patient has a clear understanding of each aspect.    Consent and Autonomy:  The patient understands that consent for the examination is an ongoing process. They have the right to withdraw consent and terminate the examination at any point should they feel uncomfortable or wish to discontinue for any reason.  The patient acknowledges the importance of communication during the examination and agrees to promptly inform the examiner if they experience any discomfort at any time. This will allow for immediate adjustments to be made to ensure their comfort and safety.    Alternatives to Internal Examination:  The patient is aware of alternative options to internal examinations, includin. Education and Instruction: Providing information and guidance without performing an internal exam.  2. External Examination: Conducting a pelvic exam while the patient remains clothed or partially clothed.  3. No Examination: The option to forgo the examination entirely if the patient chooses.  The patient has confirmed their  understanding of these alternatives and their implications.    Conclusion:  The patient’s consent is based on a thorough understanding of the risks, benefits, and purposes of the examination techniques discussed today. They have been encouraged to ask questions and express any concerns they may have, ensuring an informed and collaborative approach to their care.    Patient consents to internal work    Pelvic floor: no trigger points on assessment, mild hyperactivity bilateral levator ani, transverse perineal    TREATMENT    Manual therapy:  Time to assess abdominals and adductors for tension  Graston tool to R adductors, pt in supine with legs in butterfly  Time for internal assessment  Demo dilator training size 4 and 5  *Dilator training at home - handout and information provided      Education:  Home exercise program: Reviewed and educated patient on additions/changes for home exercise program as above (*)     Access Code: [email or web address]  URL: [email or web address]  Date: 06/19/2025  Prepared by: Tosha Villarreal    Exercises  - Supine Diaphragmatic Breathing with Pelvic Floor Lengthening  - 3 x daily - 7 x weekly - 1 sets - 10 breath hold  - Happy Baby with Pelvic Floor Lengthening  - 1 x daily - 7 x weekly - 1 sets - 10 breath hold  - Diaphragmatic Breathing in Child's Pose with Pelvic Floor Relaxation  - 1 x daily - 7 x weekly - 1 sets - 10 breath hold  - Seated Pelvic Floor Elevators With Lengthening  - 1 x daily - 7 x weekly - 3 sets - 10 s    Tosha Villarreal, PT, DPT

## 2025-06-26 ENCOUNTER — APPOINTMENT (OUTPATIENT)
Dept: PHYSICAL THERAPY | Facility: CLINIC | Age: 35
End: 2025-06-26
Payer: COMMERCIAL

## 2025-07-03 ENCOUNTER — TREATMENT (OUTPATIENT)
Dept: PHYSICAL THERAPY | Facility: CLINIC | Age: 35
End: 2025-07-03
Payer: COMMERCIAL

## 2025-07-03 DIAGNOSIS — M62.838 MUSCLE SPASM: ICD-10-CM

## 2025-07-03 DIAGNOSIS — N94.10 DYSPAREUNIA IN FEMALE: ICD-10-CM

## 2025-07-03 DIAGNOSIS — M62.81 MUSCLE WEAKNESS: ICD-10-CM

## 2025-07-03 DIAGNOSIS — N94.2 VAGINISMUS: ICD-10-CM

## 2025-07-03 PROCEDURE — 97110 THERAPEUTIC EXERCISES: CPT | Mod: GP

## 2025-07-03 PROCEDURE — 97535 SELF CARE MNGMENT TRAINING: CPT | Mod: GP

## 2025-07-03 ASSESSMENT — PAIN - FUNCTIONAL ASSESSMENT: PAIN_FUNCTIONAL_ASSESSMENT: 0-10

## 2025-07-03 ASSESSMENT — PAIN SCALES - GENERAL: PAINLEVEL_OUTOF10: 0 - NO PAIN

## 2025-07-03 NOTE — PROGRESS NOTES
Physical Therapy    Physical Therapy Treatment    Patient Name: Leslee Hernandez  MRN: [id number]  Today's Date: 7/3/2025    Time Entry:   Time Calculation  Start Time: 1001  Stop Time: 1054  Time Calculation (min): 53 min     PT Therapeutic Procedures Time Entry  Therapeutic Exercise Time Entry: 40  Self-Care/Home Mgmt Trainin                    Insurance:  Visit: #3  Authorized:  BENEFIT / NO AUTH  /10% COINS / 10 VISITS THEN TO REVIEW / $500 DED / $6600 OOP / [employer] / [employer] # [id number]   Payor/Plan: Payor: [employer] M[employer] [facility] / Plan: [employer] MED / Product Type: *No Product type* /     Current Problem  1. Muscle spasm  Follow Up In Physical Therapy      2. Dyspareunia in female  Follow Up In Physical Therapy      3. Vaginismus  Follow Up In Physical Therapy      4. Muscle weakness  Follow Up In Physical Therapy          Assessment   Patient demonstrates delayed glute activation that improves with cueing and practice. Difficulty with clamshell progression due to muscle fatigue. Patient will continue to benefit from skilled physical therapy to improve function and meet therapy goals.    Plan  Next Visit Plan: myofascial release?, internal trigger point release/review dilator and or wand, adductor release, stretches hip flexor/adductor/HS, hip abduction/extension strengthening as able, assess transverse abdominis, squatty potty handout, review education PRN      Goals:  ST Goals (4 visits):  Patient will demonstrate independence and report adherence with home exercise program (HEP) to facilitate independent rehab program upon discharge to maximize functional gains.  Patient will be able to isolate pelvic floor and contract/relax on cue to improve lumbopelvic stabilization and decreased pain  LT Goals (12 visits):  Patient will report 75% improvement in pain with sexual intercourse  Patient will demonstrate no trigger points of pelvic floor musculature for improved mobility and  "decreased pain  NIH CPSI will improve by at least 6 points. Baseline: 12    Subjective   Got a set of dilators. Has done size 4/5 pain. Has tried a few times    Thinks elevator helping with awareness of pelvic floor and has only done vibrator a few times but \"wakes things up\"    Pain:  Pain Assessment: 0-10  0-10 (Numeric) Pain Score: 0 - No pain           Objective   Additional Verbalized Informed Consent Explained by Treating Therapist: Yes  Patient Understanding of Examination Techniques:  The patient has been informed about the various examination techniques to be utilized today, including both external and internal methods. A detailed explanation of the purpose and benefits of these techniques has been provided, ensuring the patient has a clear understanding of each aspect.    Consent and Autonomy:  The patient understands that consent for the examination is an ongoing process. They have the right to withdraw consent and terminate the examination at any point should they feel uncomfortable or wish to discontinue for any reason.  The patient acknowledges the importance of communication during the examination and agrees to promptly inform the examiner if they experience any discomfort at any time. This will allow for immediate adjustments to be made to ensure their comfort and safety.    Alternatives to Internal Examination:  The patient is aware of alternative options to internal examinations, includin. Education and Instruction: Providing information and guidance without performing an internal exam.  2. External Examination: Conducting a pelvic exam while the patient remains clothed or partially clothed.  3. No Examination: The option to forgo the examination entirely if the patient chooses.  The patient has confirmed their understanding of these alternatives and their implications.    Conclusion:  The patient’s consent is based on a thorough understanding of the risks, benefits, and purposes of the " examination techniques discussed today. They have been encouraged to ask questions and express any concerns they may have, ensuring an informed and collaborative approach to their care.    No internal work    Delayed glute activation with bridge    TREATMENT    Therapeutic exercise:  *Ken clamshell progression bilaterally:  Clamshells 2x10  Reverse clamshells 2x10  Hip in abduction and flexion with internal rotation 2x10  Hip in neutral abduction with internal rotation 2x10  *Supine figure 4 piriformis stretch bilateral x1 minute  Instructed proper glute activation  *Bridge 2x10  *Supine glute stretch bilateral x1 minute  *Mobility routine 2x through bilateral: thoracic wall rainbows, kneeling hip flexor stretch, kneeing adductor rocks    Self-care/home management:  Time to discuss home exercise program, dilator training, sx, counseling  Oh-nut as option for painful intercourse      Education:  Home exercise program: Reviewed and educated patient on additions/changes for home exercise program as above (*)     Access Code: [email or web address]  URL: [email or web address]  Date: 07/03/2025  Prepared by: Tosha Villarreal    Exercises  - Supine Diaphragmatic Breathing with Pelvic Floor Lengthening  - 3 x daily - 7 x weekly - 1 sets - 10 breath hold  - Happy Baby with Pelvic Floor Lengthening  - 1 x daily - 7 x weekly - 1 sets - 10 breath hold  - Diaphragmatic Breathing in Child's Pose with Pelvic Floor Relaxation  - 1 x daily - 7 x weekly - 1 sets - 10 breath hold  - Seated Pelvic Floor Elevators With Lengthening  - 1 x daily - 7 x weekly - 3 sets - 10 reps  - Clamshell  - 1 x daily - 4 x weekly - 2 sets - 10 reps  - Sidelying Reverse Clamshell  - 1 x daily - 4 x weekly - 2 sets - 10 reps  - Sidelying Hip Flexion and Abduction with Internal Rotation  - 1 x daily - 4 x weekly - 2 sets - 10 reps  - Reverse Clamshell in Extension and Abduction  - 1 x daily - 4 x weekly - 2 sets - 10 reps  - Supine  Figure 4 Piriformis Stretch  - 1 x daily - 4 x weekly - 1 sets - 1 min hold  - Bridge  - 1 x daily - 4 x weekly - 2 sets - 10 reps  - Supine Gluteus Stretch  - 1 x daily - 4 x weekly - 1 sets - 1 min hold  - Standing Thoracic and Shoulder Flexion/Rotation AROM at [place] [place]  - 1 x daily - 7 x weekly - 2 sets - 10 reps  - Half Kneeling Hip Flexor Stretch with Sidebend  - 1 x daily - 7 x weekly - 2 sets - 10 reps  - Kneeling Adductor Stretch with Hip External Rotation  - 1 x daily - 7 x weekly - 2 sets - 10 reps    Tosha Villarreal, PT, DPT

## 2025-07-07 ENCOUNTER — OFFICE VISIT (OUTPATIENT)
Dept: PRIMARY CARE | Facility: CLINIC | Age: 35
End: 2025-07-07
Payer: COMMERCIAL

## 2025-07-07 VITALS
BODY MASS INDEX: 29.66 KG/M2 | HEART RATE: 88 BPM | SYSTOLIC BLOOD PRESSURE: 112 MMHG | RESPIRATION RATE: 16 BRPM | WEIGHT: 189 LBS | DIASTOLIC BLOOD PRESSURE: 80 MMHG | TEMPERATURE: 97.7 F | OXYGEN SATURATION: 99 % | HEIGHT: 67 IN

## 2025-07-07 DIAGNOSIS — K21.9 GASTROESOPHAGEAL REFLUX DISEASE, UNSPECIFIED WHETHER ESOPHAGITIS PRESENT: Primary | ICD-10-CM

## 2025-07-07 PROCEDURE — 3008F BODY MASS INDEX DOCD: CPT | Performed by: FAMILY MEDICINE

## 2025-07-07 PROCEDURE — 99213 OFFICE O/P EST LOW 20 MIN: CPT | Performed by: FAMILY MEDICINE

## 2025-07-07 ASSESSMENT — ENCOUNTER SYMPTOMS
OCCASIONAL FEELINGS OF UNSTEADINESS: 0
LOSS OF SENSATION IN FEET: 0
DEPRESSION: 0

## 2025-07-07 ASSESSMENT — PATIENT HEALTH QUESTIONNAIRE - PHQ9
SUM OF ALL RESPONSES TO PHQ9 QUESTIONS 1 AND 2: 0
2. FEELING DOWN, DEPRESSED OR HOPELESS: NOT AT ALL
1. LITTLE INTEREST OR PLEASURE IN DOING THINGS: NOT AT ALL

## 2025-07-07 ASSESSMENT — PAIN SCALES - GENERAL: PAINLEVEL_OUTOF10: 0-NO PAIN

## 2025-07-07 NOTE — PROGRESS NOTES
"Subjective   Patient ID: Leslee Hernandez is a 35 y.o. female with GERD who presents for Follow-up.    HPI   Since her last visit in April, she has noted successul relief of GERD symptoms since starting on omeprazole. She stopped for a week or two during this interval, and heartburn symptoms returned. She remains compliant. Endorses a slight cough, burning in the throat, and chest tightness. Denies any current symptoms of heartburn, chest pain, regurgitation, sore throat, or shortness of breath.     Notes that her migraines are moderately well controlled with eletriptan 40 mg and fremanezumab. Follows with neurology.       Review of Systems  10 point review of systems negative except as noted in HPI.      Objective   /80   Pulse 88   Temp 36.5 °C (97.7 °F)   Resp 16   Ht 1.702 m (5' 7\")   Wt 85.7 kg (189 lb)   LMP 06/26/2025   SpO2 99%   BMI 29.60 kg/m²     Physical Exam  Constitutional:       General: She is not in acute distress.     Appearance: Normal appearance.   HENT:      Head: Normocephalic and atraumatic.   Cardiovascular:      Rate and Rhythm: Normal rate and regular rhythm.   Pulmonary:      Effort: Pulmonary effort is normal.      Breath sounds: Normal breath sounds.   Abdominal:      General: Abdomen is flat. There is no distension.      Palpations: Abdomen is soft.      Tenderness: There is no abdominal tenderness. There is no guarding.   Neurological:      Mental Status: She is alert.       Assessment/Plan     Leslee Hernandez is a 35 y.o. female with GERD who presents for Follow-up visit today. Her symptoms have effectively resolved with omeprazole. Will continue with PPI treatment as tolerated.     Plan:    GERD:  -Continue with omeprazole 20mg every day  -Educated patient on the risk of acid labile organism infection associated with long-term PPI usage.   -Encouraged pt to take occasional breaks from medication to assess for symptom recurrence.     Migraines:  -Follows with " Neurology  -C/w eletriptan 40 mg ASA and montly fremanezumab 225 mg/1.5 mL.     Preventative:  -Last Pap Cotest with 10/2022. Due 10/2027  -Due for a flu vaccine in the fall      Oz Rowell  - MS3    Patient was seen and examined by myself in conjunction with medical student. I have edited note above. - JEN Shannon

## 2025-07-08 RX ORDER — OMEPRAZOLE 20 MG/1
20 CAPSULE, DELAYED RELEASE ORAL DAILY
Qty: 30 CAPSULE | Refills: 11 | Status: SHIPPED | OUTPATIENT
Start: 2025-07-08 | End: 2026-01-04

## 2025-07-14 PROCEDURE — RXMED WILLOW AMBULATORY MEDICATION CHARGE

## 2025-07-15 ENCOUNTER — PHARMACY VISIT (OUTPATIENT)
Dept: PHARMACY | Facility: CLINIC | Age: 35
End: 2025-07-15
Payer: MEDICARE

## 2025-07-17 ENCOUNTER — TREATMENT (OUTPATIENT)
Dept: PHYSICAL THERAPY | Facility: CLINIC | Age: 35
End: 2025-07-17
Payer: COMMERCIAL

## 2025-07-17 DIAGNOSIS — N94.10 DYSPAREUNIA IN FEMALE: ICD-10-CM

## 2025-07-17 DIAGNOSIS — M62.81 MUSCLE WEAKNESS: ICD-10-CM

## 2025-07-17 DIAGNOSIS — M62.838 MUSCLE SPASM: ICD-10-CM

## 2025-07-17 DIAGNOSIS — N94.2 VAGINISMUS: ICD-10-CM

## 2025-07-17 PROCEDURE — 97112 NEUROMUSCULAR REEDUCATION: CPT | Mod: GP

## 2025-07-17 PROCEDURE — 97110 THERAPEUTIC EXERCISES: CPT | Mod: GP

## 2025-07-17 ASSESSMENT — PAIN SCALES - GENERAL: PAINLEVEL_OUTOF10: 0 - NO PAIN

## 2025-07-17 ASSESSMENT — PAIN - FUNCTIONAL ASSESSMENT: PAIN_FUNCTIONAL_ASSESSMENT: 0-10

## 2025-07-17 NOTE — PROGRESS NOTES
Physical Therapy    Physical Therapy Treatment    Patient Name: Leslee Hernandez  MRN: [id number]  Today's Date: 7/17/2025    Time Entry:   Time Calculation  Start Time: 0907  Stop Time: 1000  Time Calculation (min): 53 min     PT Therapeutic Procedures Time Entry  Therapeutic Exercise Time Entry: 30  Neuromuscular Re-Education Time Entry: 23                    Insurance:  Visit: #4  Authorized: 2025 BENEFIT / NO AUTH  /10% COINS / 10 VISITS THEN TO REVIEW / $500 DED / $6600 OOP / [employer] / [employer] # [id number]   Payor/Plan: Payor: [employer] [facility] / Plan: [employer] / Product Type: *No Product type* /     Current Problem  1. Muscle spasm  Follow Up In Physical Therapy      2. Dyspareunia in female  Follow Up In Physical Therapy      3. Vaginismus  Follow Up In Physical Therapy      4. Muscle weakness  Follow Up In Physical Therapy          Assessment   Patient demonstrates difficulty engaging transverse abdominis and isolating that improves with use of real time ultrasound for feedback. Appropriately challenged by exercise progressions. Patient will continue to benefit from skilled physical therapy to improve function and meet therapy goals.    Plan  Next Visit Plan: myofascial release?, internal trigger point release/review dilator and or wand, adductor release, stretches hip flexor/adductor/HS, progress ex to standing, assess transverse abdominis, squatty potty handout, review education PRN      Goals:  ST Goals (4 visits):  Patient will demonstrate independence and report adherence with home exercise program (HEP) to facilitate independent rehab program upon discharge to maximize functional gains.  Patient will be able to isolate pelvic floor and contract/relax on cue to improve lumbopelvic stabilization and decreased pain  LT Goals (12 visits):  Patient will report 75% improvement in pain with sexual intercourse  Patient will demonstrate no trigger points of pelvic floor musculature  for improved mobility and decreased pain  NIH CPSI will improve by at least 6 points. Baseline: 12    Subjective   Has been doing clamshells and feels more stable walking    Dilator training. Still on size 4 and feels like getting easier    Pain with sex less intense (4 to a 2)    Pain:  Pain Assessment: 0-10  0-10 (Numeric) Pain Score: 0 - No pain           Objective   Additional Verbalized Informed Consent Explained by Treating Therapist: Yes  Patient Understanding of Examination Techniques:  The patient has been informed about the various examination techniques to be utilized today, including both external and internal methods. A detailed explanation of the purpose and benefits of these techniques has been provided, ensuring the patient has a clear understanding of each aspect.    Consent and Autonomy:  The patient understands that consent for the examination is an ongoing process. They have the right to withdraw consent and terminate the examination at any point should they feel uncomfortable or wish to discontinue for any reason.  The patient acknowledges the importance of communication during the examination and agrees to promptly inform the examiner if they experience any discomfort at any time. This will allow for immediate adjustments to be made to ensure their comfort and safety.    Alternatives to Internal Examination:  The patient is aware of alternative options to internal examinations, includin. Education and Instruction: Providing information and guidance without performing an internal exam.  2. External Examination: Conducting a pelvic exam while the patient remains clothed or partially clothed.  3. No Examination: The option to forgo the examination entirely if the patient chooses.  The patient has confirmed their understanding of these alternatives and their implications.    Conclusion:  The patient’s consent is based on a thorough understanding of the risks, benefits, and purposes of the  examination techniques discussed today. They have been encouraged to ask questions and express any concerns they may have, ensuring an informed and collaborative approach to their care.    No internal work    Trunk strength: transverse abdominis fair, oblique dominant    TREATMENT    Neuromuscular reeducation:  Instructed proper transverse abdominis contraction in supine hooklying  *Supine hooklying transverse abdominis isometric contractions 3x10  Rehabilitative real-time ultrasound for neuromuscular re-education of transverse abdominis   *transverse abdominis with BKF bilateral 3x3    Therapeutic exercise:  Ken clamshell progression bilaterally:  Clamshells 2x10  Reverse clamshells 2x10  Hip in abduction and flexion with internal rotation 2x10  Hip in neutral abduction with internal rotation 2x10  Supine figure 4 piriformis stretch bilateral x1 minute  *Bridge with adduction vs ball 2x10  *Bridge with abduction vs GTB 2x10  Happy baby with diaphragmatic breathing and pelvic floor relaxation x10 breaths  Child's pose with diaphragmatic breathing and pelvic floor relaxation x10 breaths        Education:  Home exercise program: Reviewed and educated patient on additions/changes for home exercise program as above (*)     Access Code: [email or web address]  URL: [email or web address]  Date: 07/17/2025  Prepared by: Tosha Villarreal    Exercises  - Supine Diaphragmatic Breathing with Pelvic Floor Lengthening  - 3 x daily - 7 x weekly - 1 sets - 10 breath hold  - Happy Baby with Pelvic Floor Lengthening  - 1 x daily - 7 x weekly - 1 sets - 10 breath hold  - Diaphragmatic Breathing in Child's Pose with Pelvic Floor Relaxation  - 1 x daily - 7 x weekly - 1 sets - 10 breath hold  - Seated Pelvic Floor Elevators With Lengthening  - 1 x daily - 7 x weekly - 3 sets - 10 reps  - Clamshell  - 1 x daily - 4 x weekly - 2 sets - 10 reps  - Sidelying Reverse Clamshell  - 1 x daily - 4 x weekly - 2 sets - 10  reps  - Sidelying Hip Flexion and Abduction with Internal Rotation  - 1 x daily - 4 x weekly - 2 sets - 10 reps  - Reverse Clamshell in Extension and Abduction  - 1 x daily - 4 x weekly - 2 sets - 10 reps  - Supine Figure 4 Piriformis Stretch  - 1 x daily - 4 x weekly - 1 sets - 1 min hold  - Supine Gluteus Stretch  - 1 x daily - 4 x weekly - 1 sets - 1 min hold  - Standing Thoracic and Shoulder Flexion/Rotation AROM at Wall: Wall [place]  - 1 x daily - 7 x weekly - 2 sets - 10 reps  - Half Kneeling Hip Flexor Stretch with Sidebend  - 1 x daily - 7 x weekly - 2 sets - 10 reps  - Kneeling Adductor Stretch with Hip External Rotation  - 1 x daily - 7 x weekly - 2 sets - 10 reps  - Supine Bridge with Mini Swiss Ball Between Knees  - 1 x daily - 4 x weekly - 2 sets - 10 reps  - Bridge with Hip Abduction and Resistance  - 1 x daily - 4 x weekly - 2 sets - 10 reps  - Hooklying Transversus Abdominis Palpation  - 1 x daily - 4 x weekly - 1 sets - 10 reps  - Bent Knee Fallouts  - 1 x daily - 4 x weekly - 10 sets - 3-4 reps    Tosha Villarreal, PT, DPT

## 2025-07-31 ENCOUNTER — APPOINTMENT (OUTPATIENT)
Dept: PHYSICAL THERAPY | Facility: CLINIC | Age: 35
End: 2025-07-31
Payer: COMMERCIAL

## 2025-08-01 ENCOUNTER — TREATMENT (OUTPATIENT)
Dept: PHYSICAL THERAPY | Facility: CLINIC | Age: 35
End: 2025-08-01
Payer: COMMERCIAL

## 2025-08-01 DIAGNOSIS — M62.838 MUSCLE SPASM: ICD-10-CM

## 2025-08-01 DIAGNOSIS — N94.10 DYSPAREUNIA IN FEMALE: ICD-10-CM

## 2025-08-01 DIAGNOSIS — M62.81 MUSCLE WEAKNESS: ICD-10-CM

## 2025-08-01 DIAGNOSIS — N94.2 VAGINISMUS: ICD-10-CM

## 2025-08-01 PROCEDURE — 97140 MANUAL THERAPY 1/> REGIONS: CPT | Mod: GP

## 2025-08-01 PROCEDURE — 97110 THERAPEUTIC EXERCISES: CPT | Mod: GP

## 2025-08-01 ASSESSMENT — PAIN - FUNCTIONAL ASSESSMENT: PAIN_FUNCTIONAL_ASSESSMENT: 0-10

## 2025-08-01 ASSESSMENT — PAIN SCALES - GENERAL: PAINLEVEL_OUTOF10: 2

## 2025-08-01 ASSESSMENT — PAIN DESCRIPTION - DESCRIPTORS: DESCRIPTORS: SHARP

## 2025-08-01 NOTE — PROGRESS NOTES
Physical Therapy    Physical Therapy Treatment    Patient Name: Leslee Hernandez  MRN: [id number]  Today's Date: 8/1/2025    Time Entry:   Time Calculation  Start Time: 1004  Stop Time: 1059  Time Calculation (min): 55 min     PT Therapeutic Procedures Time Entry  Therapeutic Exercise Time Entry: 32  Manual Therapy Time Entry: 23                    Insurance:  Visit: #5  Authorized: 2025 BENEFIT / NO AUTH  /10% COINS / 10 VISITS THEN TO REVIEW / $500 DED / $6600 OOP / [employer] / [employer] # [id number]   Payor/Plan: Payor: [employer] [facility] / Plan: [employer] / Product Type: *No Product type* /     Current Problem  1. Muscle spasm  Follow Up In Physical Therapy      2. Dyspareunia in female  Follow Up In Physical Therapy      3. Vaginismus  Follow Up In Physical Therapy      4. Muscle weakness  Follow Up In Physical Therapy          Assessment   Patient demonstrates positive FADDIR on right that improves with soft tissue mobilization. Difficulty with hip mobility exercises due to limited hip internal rotation bilateral. Patient will continue to benefit from skilled physical therapy to improve function and meet therapy goals.    Plan  Next Visit Plan: myofascial release?, internal trigger point release/review dilator and or wand, adductor release, stretches hip flexor/adductor/HS, progress ex to standing, assess transverse abdominis, squatty potty handout, review education PRN      Goals:  ST Goals (4 visits):  Patient will demonstrate independence and report adherence with home exercise program (HEP) to facilitate independent rehab program upon discharge to maximize functional gains.  Patient will be able to isolate pelvic floor and contract/relax on cue to improve lumbopelvic stabilization and decreased pain  LT Goals (12 visits):  Patient will report 75% improvement in pain with sexual intercourse  Patient will demonstrate no trigger points of pelvic floor musculature for improved mobility  and decreased pain  NIH CPSI will improve by at least 6 points. Baseline: 12    Subjective   Hip started hurting last Saturday (right). Stiff/painful. Has fluctuated up and down since.    Didn't do exercises for a few days.     Dilator training still going well. Still on size 4 but thinks if more consistent could move up size    Pain:  Pain Assessment: 0-10  0-10 (Numeric) Pain Score: 2 (7/10 at worst)  Pain Location: Hip  Pain Orientation: Right  Pain Descriptors: Sharp  Pain Frequency: Intermittent     Makes feel better: laying with hips elevated  Makes feel worse: getting up after extended rest      Objective   Additional Verbalized Informed Consent Explained by Treating Therapist: Yes  Patient Understanding of Examination Techniques:  The patient has been informed about the various examination techniques to be utilized today, including both external and internal methods. A detailed explanation of the purpose and benefits of these techniques has been provided, ensuring the patient has a clear understanding of each aspect.    Consent and Autonomy:  The patient understands that consent for the examination is an ongoing process. They have the right to withdraw consent and terminate the examination at any point should they feel uncomfortable or wish to discontinue for any reason.  The patient acknowledges the importance of communication during the examination and agrees to promptly inform the examiner if they experience any discomfort at any time. This will allow for immediate adjustments to be made to ensure their comfort and safety.    Alternatives to Internal Examination:  The patient is aware of alternative options to internal examinations, includin. Education and Instruction: Providing information and guidance without performing an internal exam.  2. External Examination: Conducting a pelvic exam while the patient remains clothed or partially clothed.  3. No Examination: The option to forgo the examination  entirely if the patient chooses.  The patient has confirmed their understanding of these alternatives and their implications.    Conclusion:  The patient’s consent is based on a thorough understanding of the risks, benefits, and purposes of the examination techniques discussed today. They have been encouraged to ask questions and express any concerns they may have, ensuring an informed and collaborative approach to their care.    No internal work    FADDIR: R +, L -  JASON: R/L -    Slight knee valgus with squat to chair    TREATMENT    Manual therapy:  Time to assess R hip pain  Soft tissue mobilization R glute max/glute med      Therapeutic exercise:  Transverse abdominis with march 2x20  *transverse abdominis with march plus leg extension x20  *Hip mobility in seated 90/90 - internal rotation/external rotation bilateral x10, hip switches x10  *Squat to chair 2x10          Education:  Home exercise program: Reviewed and educated patient on additions/changes for home exercise program as above (*)     Access Code: [email or web address]  URL: [email or web address]  Date: 07/17/2025  Prepared by: Tosha Villarreal    Exercises  - Supine Diaphragmatic Breathing with Pelvic Floor Lengthening  - 3 x daily - 7 x weekly - 1 sets - 10 breath hold  - Happy Baby with Pelvic Floor Lengthening  - 1 x daily - 7 x weekly - 1 sets - 10 breath hold  - Diaphragmatic Breathing in Child's Pose with Pelvic Floor Relaxation  - 1 x daily - 7 x weekly - 1 sets - 10 breath hold  - Seated Pelvic Floor Elevators With Lengthening  - 1 x daily - 7 x weekly - 3 sets - 10 reps  - Clamshell  - 1 x daily - 4 x weekly - 2 sets - 10 reps  - Sidelying Reverse Clamshell  - 1 x daily - 4 x weekly - 2 sets - 10 reps  - Sidelying Hip Flexion and Abduction with Internal Rotation  - 1 x daily - 4 x weekly - 2 sets - 10 reps  - Reverse Clamshell in Extension and Abduction  - 1 x daily - 4 x weekly - 2 sets - 10 reps  - Supine Figure 4  Piriformis Stretch  - 1 x daily - 4 x weekly - 1 sets - 1 min hold  - Supine Gluteus Stretch  - 1 x daily - 4 x weekly - 1 sets - 1 min hold  - Standing Thoracic and Shoulder Flexion/Rotation AROM at Wall: Wall [place]  - 1 x daily - 7 x weekly - 2 sets - 10 reps  - Half Kneeling Hip Flexor Stretch with Sidebend  - 1 x daily - 7 x weekly - 2 sets - 10 reps  - Kneeling Adductor Stretch with Hip External Rotation  - 1 x daily - 7 x weekly - 2 sets - 10 reps  - Supine Bridge with Mini Swiss Ball Between Knees  - 1 x daily - 4 x weekly - 2 sets - 10 reps  - Bridge with Hip Abduction and Resistance  - 1 x daily - 4 x weekly - 2 sets - 10 reps  - Hooklying Transversus Abdominis Palpation  - 1 x daily - 4 x weekly - 1 sets - 10 reps  - Bent Knee Fallouts  - 1 x daily - 4 x weekly - 10 sets - 3-4 reps    Tosha Villarreal, PT, DPT

## 2025-08-06 PROCEDURE — RXMED WILLOW AMBULATORY MEDICATION CHARGE

## 2025-08-08 ENCOUNTER — PHARMACY VISIT (OUTPATIENT)
Dept: PHARMACY | Facility: CLINIC | Age: 35
End: 2025-08-08
Payer: MEDICARE

## 2025-08-13 ENCOUNTER — TREATMENT (OUTPATIENT)
Dept: PHYSICAL THERAPY | Facility: CLINIC | Age: 35
End: 2025-08-13
Payer: COMMERCIAL

## 2025-08-13 DIAGNOSIS — N94.2 VAGINISMUS: ICD-10-CM

## 2025-08-13 DIAGNOSIS — N94.10 DYSPAREUNIA IN FEMALE: ICD-10-CM

## 2025-08-13 DIAGNOSIS — M62.838 MUSCLE SPASM: ICD-10-CM

## 2025-08-13 DIAGNOSIS — M62.81 MUSCLE WEAKNESS: ICD-10-CM

## 2025-08-13 PROCEDURE — 97110 THERAPEUTIC EXERCISES: CPT | Mod: GP

## 2025-08-13 PROCEDURE — 97140 MANUAL THERAPY 1/> REGIONS: CPT | Mod: GP

## 2025-08-13 ASSESSMENT — PAIN SCALES - GENERAL: PAINLEVEL_OUTOF10: 0 - NO PAIN

## 2025-08-13 ASSESSMENT — PAIN - FUNCTIONAL ASSESSMENT: PAIN_FUNCTIONAL_ASSESSMENT: 0-10

## 2025-09-03 PROCEDURE — RXMED WILLOW AMBULATORY MEDICATION CHARGE

## 2025-09-04 ENCOUNTER — PHARMACY VISIT (OUTPATIENT)
Dept: PHARMACY | Facility: CLINIC | Age: 35
End: 2025-09-04
Payer: COMMERCIAL

## 2025-09-04 ENCOUNTER — PHARMACY VISIT (OUTPATIENT)
Dept: PHARMACY | Facility: CLINIC | Age: 35
End: 2025-09-04
Payer: MEDICARE

## 2025-10-20 ENCOUNTER — APPOINTMENT (OUTPATIENT)
Dept: ENDOCRINOLOGY | Facility: CLINIC | Age: 35
End: 2025-10-20
Payer: COMMERCIAL